# Patient Record
Sex: MALE | Race: BLACK OR AFRICAN AMERICAN | Employment: UNEMPLOYED | URBAN - METROPOLITAN AREA
[De-identification: names, ages, dates, MRNs, and addresses within clinical notes are randomized per-mention and may not be internally consistent; named-entity substitution may affect disease eponyms.]

---

## 2022-10-31 ENCOUNTER — APPOINTMENT (OUTPATIENT)
Dept: MRI IMAGING | Age: 67
DRG: 065 | End: 2022-10-31
Attending: INTERNAL MEDICINE
Payer: MEDICARE

## 2022-10-31 ENCOUNTER — HOSPITAL ENCOUNTER (INPATIENT)
Age: 67
LOS: 4 days | Discharge: HOME HEALTH | DRG: 065 | End: 2022-11-04
Attending: STUDENT IN AN ORGANIZED HEALTH CARE EDUCATION/TRAINING PROGRAM | Admitting: INTERNAL MEDICINE
Payer: MEDICARE

## 2022-10-31 ENCOUNTER — APPOINTMENT (OUTPATIENT)
Dept: GENERAL RADIOLOGY | Age: 67
DRG: 065 | End: 2022-10-31
Attending: EMERGENCY MEDICINE
Payer: MEDICARE

## 2022-10-31 ENCOUNTER — APPOINTMENT (OUTPATIENT)
Dept: CT IMAGING | Age: 67
DRG: 065 | End: 2022-10-31
Attending: EMERGENCY MEDICINE
Payer: MEDICARE

## 2022-10-31 DIAGNOSIS — I65.23 BILATERAL CAROTID ARTERY STENOSIS: ICD-10-CM

## 2022-10-31 DIAGNOSIS — I63.9 ACUTE CVA (CEREBROVASCULAR ACCIDENT) (HCC): ICD-10-CM

## 2022-10-31 DIAGNOSIS — R53.1 RIGHT SIDED WEAKNESS: Primary | ICD-10-CM

## 2022-10-31 LAB
ALBUMIN SERPL-MCNC: 3.9 G/DL (ref 3.5–5)
ALBUMIN/GLOB SERPL: 1.1 {RATIO} (ref 1.1–2.2)
ALP SERPL-CCNC: 76 U/L (ref 45–117)
ALT SERPL-CCNC: 33 U/L (ref 12–78)
ANION GAP SERPL CALC-SCNC: 3 MMOL/L (ref 5–15)
AST SERPL-CCNC: 22 U/L (ref 15–37)
BASOPHILS # BLD: 0 K/UL (ref 0–0.1)
BASOPHILS NFR BLD: 1 % (ref 0–1)
BILIRUB SERPL-MCNC: 0.6 MG/DL (ref 0.2–1)
BUN SERPL-MCNC: 8 MG/DL (ref 6–20)
BUN/CREAT SERPL: 8 (ref 12–20)
CALCIUM SERPL-MCNC: 9.2 MG/DL (ref 8.5–10.1)
CHLORIDE SERPL-SCNC: 108 MMOL/L (ref 97–108)
CO2 SERPL-SCNC: 29 MMOL/L (ref 21–32)
CREAT SERPL-MCNC: 1 MG/DL (ref 0.7–1.3)
DIFFERENTIAL METHOD BLD: NORMAL
EOSINOPHIL # BLD: 0.1 K/UL (ref 0–0.4)
EOSINOPHIL NFR BLD: 1 % (ref 0–7)
ERYTHROCYTE [DISTWIDTH] IN BLOOD BY AUTOMATED COUNT: 13.8 % (ref 11.5–14.5)
GLOBULIN SER CALC-MCNC: 3.5 G/DL (ref 2–4)
GLUCOSE SERPL-MCNC: 106 MG/DL (ref 65–100)
HCT VFR BLD AUTO: 49.2 % (ref 36.6–50.3)
HGB BLD-MCNC: 16.6 G/DL (ref 12.1–17)
IMM GRANULOCYTES # BLD AUTO: 0 K/UL (ref 0–0.04)
IMM GRANULOCYTES NFR BLD AUTO: 0 % (ref 0–0.5)
INR PPP: 1 (ref 0.9–1.1)
LYMPHOCYTES # BLD: 2.1 K/UL (ref 0.8–3.5)
LYMPHOCYTES NFR BLD: 41 % (ref 12–49)
MCH RBC QN AUTO: 32.9 PG (ref 26–34)
MCHC RBC AUTO-ENTMCNC: 33.7 G/DL (ref 30–36.5)
MCV RBC AUTO: 97.6 FL (ref 80–99)
MONOCYTES # BLD: 0.6 K/UL (ref 0–1)
MONOCYTES NFR BLD: 13 % (ref 5–13)
NEUTS SEG # BLD: 2.3 K/UL (ref 1.8–8)
NEUTS SEG NFR BLD: 44 % (ref 32–75)
NRBC # BLD: 0 K/UL (ref 0–0.01)
NRBC BLD-RTO: 0 PER 100 WBC
PLATELET # BLD AUTO: 201 K/UL (ref 150–400)
PMV BLD AUTO: 10.8 FL (ref 8.9–12.9)
POTASSIUM SERPL-SCNC: 4.2 MMOL/L (ref 3.5–5.1)
PROT SERPL-MCNC: 7.4 G/DL (ref 6.4–8.2)
PROTHROMBIN TIME: 10.3 SEC (ref 9–11.1)
RBC # BLD AUTO: 5.04 M/UL (ref 4.1–5.7)
SODIUM SERPL-SCNC: 140 MMOL/L (ref 136–145)
TROPONIN-HIGH SENSITIVITY: 15 NG/L (ref 0–76)
WBC # BLD AUTO: 5.1 K/UL (ref 4.1–11.1)

## 2022-10-31 PROCEDURE — 36415 COLL VENOUS BLD VENIPUNCTURE: CPT

## 2022-10-31 PROCEDURE — 70551 MRI BRAIN STEM W/O DYE: CPT

## 2022-10-31 PROCEDURE — 85610 PROTHROMBIN TIME: CPT

## 2022-10-31 PROCEDURE — 94760 N-INVAS EAR/PLS OXIMETRY 1: CPT

## 2022-10-31 PROCEDURE — 80053 COMPREHEN METABOLIC PANEL: CPT

## 2022-10-31 PROCEDURE — 74011250637 HC RX REV CODE- 250/637: Performed by: INTERNAL MEDICINE

## 2022-10-31 PROCEDURE — 71045 X-RAY EXAM CHEST 1 VIEW: CPT

## 2022-10-31 PROCEDURE — 70450 CT HEAD/BRAIN W/O DYE: CPT

## 2022-10-31 PROCEDURE — 74011250636 HC RX REV CODE- 250/636: Performed by: INTERNAL MEDICINE

## 2022-10-31 PROCEDURE — 65270000046 HC RM TELEMETRY

## 2022-10-31 PROCEDURE — 93005 ELECTROCARDIOGRAM TRACING: CPT

## 2022-10-31 PROCEDURE — 85025 COMPLETE CBC W/AUTO DIFF WBC: CPT

## 2022-10-31 PROCEDURE — 99285 EMERGENCY DEPT VISIT HI MDM: CPT

## 2022-10-31 PROCEDURE — 84484 ASSAY OF TROPONIN QUANT: CPT

## 2022-10-31 RX ORDER — AMLODIPINE BESYLATE 5 MG/1
5 TABLET ORAL DAILY
Status: DISCONTINUED | OUTPATIENT
Start: 2022-10-31 | End: 2022-11-01

## 2022-10-31 RX ORDER — LABETALOL HYDROCHLORIDE 5 MG/ML
5 INJECTION, SOLUTION INTRAVENOUS
Status: DISCONTINUED | OUTPATIENT
Start: 2022-10-31 | End: 2022-11-04 | Stop reason: HOSPADM

## 2022-10-31 RX ORDER — ENOXAPARIN SODIUM 100 MG/ML
40 INJECTION SUBCUTANEOUS EVERY 24 HOURS
Status: DISCONTINUED | OUTPATIENT
Start: 2022-10-31 | End: 2022-11-04 | Stop reason: HOSPADM

## 2022-10-31 RX ORDER — GUAIFENESIN 100 MG/5ML
81 LIQUID (ML) ORAL DAILY
Status: DISCONTINUED | OUTPATIENT
Start: 2022-11-01 | End: 2022-11-04 | Stop reason: HOSPADM

## 2022-10-31 RX ORDER — ACETAMINOPHEN 325 MG/1
650 TABLET ORAL
Status: DISCONTINUED | OUTPATIENT
Start: 2022-10-31 | End: 2022-11-04 | Stop reason: HOSPADM

## 2022-10-31 RX ADMIN — ENOXAPARIN SODIUM 40 MG: 100 INJECTION SUBCUTANEOUS at 15:54

## 2022-10-31 RX ADMIN — AMLODIPINE BESYLATE 5 MG: 5 TABLET ORAL at 15:54

## 2022-10-31 NOTE — PROGRESS NOTES
End of Shift Note    Bedside shift change report given to Alena Bond, RN (oncoming nurse) by Diego Lobato RN (offgoing nurse). Report included the following information     Shift worked:  7a-7p   Shift summary and any significant changes:     New admit, MRI completed, CVA education and risk factors discussed        Concerns for physician to address:     Zone phone for oncoming shift:        Patient Information  Margarita Fields  74 y.o.  10/31/2022  1:58 PM by Jalyn Henry MD. Margarita Fields was admitted from     Problem List  Patient Active Problem List    Diagnosis Date Noted    Acute CVA (cerebrovascular accident) ([facility] [address] 10/31/2022     No past medical history on file. Core Measures:  CVA:  Y  CHF: N  PNA: N    Activity:  Activity Level: Up with Assistance  Number times ambulated in hallways past shift:   Number of times OOB to chair past shift:     Cardiac:   Cardiac Monitoring:            Access:   Current line(s):    Central Line? Placement date  Reason Medically Necessary   PICC LINE? Placement date Reason Medically Necessary     Genitourinary:   Urinary status:   Urinary Catheter? Placement Date  Reason Medically Necessary     Respiratory:   O2 Device: None (Room air)  Chronic home O2 use?:   Incentive spirometer at bedside:        GI:     Current diet:  ADULT DIET Regular  DIET ONE TIME MESSAGE  Passing flatus: Tolerating current diet:        Pain Management:   Patient states pain is manageable on current regimen:     Skin:  Ger Score: 21  Interventions:     Patient Safety:  Fall Score:  Total Score: 2  Interventions:      @Rollbelt  @dexterity to release roll belt  Yes/No ( must document dexterity  here by stating Yes or No here, otherwise this is a restraint and must follow restraint documentation policy.)    DVT prophylaxis:  DVT prophylaxis Med-   DVT prophylaxis SCD or VIJAYA-      Wounds: (If Applicable)  Wounds-   Location     Active Consults:  IP CONSULT TO HOSPITALIST  IP CONSULT TO NEUROLOGY    Length of Stay:  Expected LOS: - - -  Actual LOS: 0  Discharge Plan:

## 2022-10-31 NOTE — PROGRESS NOTES
MRI Pending:    Need completed online [employer] MRI screening form. Sign and fax to [phone]. Call [phone] once faxed.

## 2022-10-31 NOTE — ED PROVIDER NOTES
EMERGENCY DEPARTMENT HISTORY AND PHYSICAL EXAM      Date: 10/31/2022  Patient Name: Lona Salcido    History of Presenting Illness     Chief Complaint   Patient presents with    Difficulty Walking     Started on Saturday; patient states he woke up and was unable to walk; ambulatory in triage with a waddle; he states \" I was dunking basketballs on Friday. Reports right sided weakness mainly with standing. HPI: Lona Salcido, 74 y.o. male presents to the ED with cc of right-sided weakness. He notices when he woke up in the morning Saturday. Last known well would have been Friday around 11:30 PM before he went to bed. He feels like his entire right arm and right leg are weak, and also a bit numb. It makes it hard to walk and hard to  things. He denies any difficulty speaking or swallowing, no vision changes, no headaches. He otherwise feels well, no fevers. She denies any history of prior strokes. There are no other complaints, changes, or physical findings at this time. PCP: None    Medications: Does not take any current outpatient medications      Past History     Past Medical History:  No known past medical history, however he does not have a primary care doctor or get regular medical care    Past Surgical History:  Denies prior surgeries    Family History:  Hypertension, diabetes    Social History:  Reports history of tobacco and alcohol, denies illicit drug use    Allergies:  No Known Allergies      Review of Systems   no fever  No ear pain  No eye pain  no shortness of breath  no chest pain  no abdominal pain  no dysuria  no leg pain  No rash  No lymphadenopathy  No weight loss    Physical Exam   Physical Exam  Constitutional:       General: He is not in acute distress. Appearance: He is not toxic-appearing. HENT:      Head: Normocephalic and atraumatic. Eyes:      Extraocular Movements: Extraocular movements intact.    Cardiovascular:      Rate and Rhythm: Normal rate and regular rhythm. Pulmonary:      Effort: Pulmonary effort is normal.      Breath sounds: Normal breath sounds. Abdominal:      Palpations: Abdomen is soft. Tenderness: There is no abdominal tenderness. Musculoskeletal:         General: No deformity. Cervical back: Neck supple. Skin:     General: Skin is warm and dry. Neurological:      Mental Status: He is alert and oriented to person, place, and time. Cranial Nerves: No cranial nerve deficit. Comments: Normal straight arm and straight leg raise bilaterally, however diminished strength against resistance with right shoulder flexion as compared to 5 out of 5 strength with the left shoulder flexion. Diminished strength against resistance of the right hip flexion as compared to 5 out of 5 strength with left hip flexion. Diminished strength with right ankle flexion and extension, 5 out of 5 strength with plantar ankle flexion extension. Sensation to light touch intact diffusely symmetrically over upper and lower extremities bilaterally, speech is clear and coherent. Normal finger-to-nose test bilaterally. Negative extinction in upper and lower extremities bilaterally, visual fields intact bilaterally. Psychiatric:         Mood and Affect: Mood normal.       Diagnostic Study Results     Labs -     Recent Results (from the past 24 hour(s))   CBC WITH AUTOMATED DIFF    Collection Time: 10/31/22 12:35 PM   Result Value Ref Range    WBC 5.1 4.1 - 11.1 K/uL    RBC 5.04 4.10 - 5.70 M/uL    HGB 16.6 12.1 - 17.0 g/dL    HCT 49.2 36.6 - 50.3 %    MCV 97.6 80.0 - 99.0 FL    MCH 32.9 26.0 - 34.0 PG    MCHC 33.7 30.0 - 36.5 g/dL    RDW 13.8 11.5 - 14.5 %    PLATELET 728 006 - 354 K/uL    MPV 10.8 8.9 - 12.9 FL    NRBC 0.0 0  WBC    ABSOLUTE NRBC 0.00 0.00 - 0.01 K/uL    NEUTROPHILS 44 32 - 75 %    LYMPHOCYTES 41 12 - 49 %    MONOCYTES 13 5 - 13 %    EOSINOPHILS 1 0 - 7 %    BASOPHILS 1 0 - 1 %    IMMATURE GRANULOCYTES 0 0.0 - 0.5 %    ABS. NEUTROPHILS 2.3 1.8 - 8.0 K/UL    ABS. LYMPHOCYTES 2.1 0.8 - 3.5 K/UL    ABS. MONOCYTES 0.6 0.0 - 1.0 K/UL    ABS. EOSINOPHILS 0.1 0.0 - 0.4 K/UL    ABS. BASOPHILS 0.0 0.0 - 0.1 K/UL    ABS. IMM. GRANS. 0.0 0.00 - 0.04 K/UL    DF AUTOMATED     METABOLIC PANEL, COMPREHENSIVE    Collection Time: 10/31/22 12:35 PM   Result Value Ref Range    Sodium 140 136 - 145 mmol/L    Potassium 4.2 3.5 - 5.1 mmol/L    Chloride 108 97 - 108 mmol/L    CO2 29 21 - 32 mmol/L    Anion gap 3 (L) 5 - 15 mmol/L    Glucose 106 (H) 65 - 100 mg/dL    BUN 8 6 - 20 MG/DL    Creatinine 1.00 0.70 - 1.30 MG/DL    BUN/Creatinine ratio 8 (L) 12 - 20      eGFR >60 >60 ml/min/1.73m2    Calcium 9.2 8.5 - 10.1 MG/DL    Bilirubin, total 0.6 0.2 - 1.0 MG/DL    ALT (SGPT) 33 12 - 78 U/L    AST (SGOT) 22 15 - 37 U/L    Alk. phosphatase 76 45 - 117 U/L    Protein, total 7.4 6.4 - 8.2 g/dL    Albumin 3.9 3.5 - 5.0 g/dL    Globulin 3.5 2.0 - 4.0 g/dL    A-G Ratio 1.1 1.1 - 2.2     PROTHROMBIN TIME + INR    Collection Time: 10/31/22 12:35 PM   Result Value Ref Range    INR 1.0 0.9 - 1.1      Prothrombin time 10.3 9.0 - 11.1 sec   TROPONIN-HIGH SENSITIVITY    Collection Time: 10/31/22 12:35 PM   Result Value Ref Range    Troponin-High Sensitivity 15 0 - 76 ng/L       Radiologic Studies -   CT HEAD WO CONT   Final Result   No acute intracranial abnormality            XR CHEST PORT   Final Result      No acute process on portable chest.         MRI BRAIN WO CONT    (Results Pending)     CT Results  (Last 48 hours)                 10/31/22 1227  CT HEAD WO CONT Final result    Impression:  No acute intracranial abnormality               Narrative:  EXAM: CT HEAD WO CONT       INDICATION: R weakness       COMPARISON: None. CONTRAST: None. TECHNIQUE: Unenhanced CT of the head was performed using 5 mm images. Brain and   bone windows were generated. Coronal and sagittal reformats.  CT dose reduction   was achieved through use of a standardized protocol tailored for this   examination and automatic exposure control for dose modulation. FINDINGS:   The ventricles and sulci are normal in size, shape and configuration. . There is   no significant white matter disease. There is no intracranial hemorrhage,   extra-axial collection, or mass effect. The basilar cisterns are open. No CT   evidence of acute infarct. The bone windows demonstrate no abnormalities. Mucous retention cyst is noted in   the left maxillary sinus. .                 CXR Results  (Last 48 hours)                 10/31/22 1222  XR CHEST PORT Final result    Impression:      No acute process on portable chest.           Narrative:  EXAM:  XR CHEST PORT       INDICATION: Stroke       COMPARISON: none       TECHNIQUE: portable chest AP view       FINDINGS: The cardiac silhouette is within normal limits. The pulmonary   vasculature is within normal limits. The lungs and pleural spaces are clear. The visualized bones and upper abdomen   are age-appropriate. Medical Decision Making   I am the first provider for this patient. I reviewed the vital signs, available nursing notes, past medical history, past surgical history, family history and social history. Vital Signs-Reviewed the patient's vital signs. Patient Vitals for the past 24 hrs:   Temp Pulse Resp BP SpO2   10/31/22 1156 98.4 °F (36.9 °C) 68 16 (!) 173/93 97 %         Provider Notes (Medical Decision Making):   82-year-old male presenting with right-sided weakness and numbness. Differential includes CVA, TIA, intracranial mass. ED Course:     Initial assessment performed. The patients presenting problems have been discussed, and they are in agreement with the care plan formulated and outlined with them. I have encouraged them to ask questions as they arise throughout their visit.         CBC negative for leukocytosis or anemia, basic metabolic panel with normal renal function, no worrisome electrolyte abnormalities, troponin is not significantly elevated at 15, CT head shows no acute intracranial abnormality, chest x-ray shows no acute process. EKG is performed at 13: 02, shows sinus bradycardia rate of 55, , QRS 92, QTc 397, axis upright, no ST segment elevation or depression concerning for ACS, this is interpreted as normal sinus bradycardia. Chart is reviewed, patient has not no prior medical encounters in our system, he has blood pressure is elevated here, I am concerned for potential undiagnosed hypertension versus elevated blood pressure in setting of CVA. Otherwise his vitals are unremarkable. Reevaluation, he is resting comfortably. Agrees to admission. Critical Care Time:         Disposition:  Admit    PLAN:  1. There are no discharge medications for this patient.     2.   Follow-up Information    None       Return to ED if worse     Diagnosis     Clinical Impression: Acute right-sided weakness and numbness

## 2022-10-31 NOTE — ED NOTES
Report was give to robin rn questions asked and answered.  Room was still not cleaned so rn will call back when cleaned for transport

## 2022-10-31 NOTE — PROGRESS NOTES
Speech Pathology Note    SLP orders received, however note H&P still pending. SLP will follow for formal evaluation when additional information available in chart. Thank you.     Junior Chris M.S., CCC-SLP

## 2022-11-01 ENCOUNTER — APPOINTMENT (OUTPATIENT)
Dept: VASCULAR SURGERY | Age: 67
DRG: 065 | End: 2022-11-01
Attending: INTERNAL MEDICINE
Payer: MEDICARE

## 2022-11-01 LAB
ANION GAP SERPL CALC-SCNC: 5 MMOL/L (ref 5–15)
BUN SERPL-MCNC: 8 MG/DL (ref 6–20)
BUN/CREAT SERPL: 9 (ref 12–20)
CALCIUM SERPL-MCNC: 8.9 MG/DL (ref 8.5–10.1)
CHLORIDE SERPL-SCNC: 107 MMOL/L (ref 97–108)
CHOLEST SERPL-MCNC: 159 MG/DL
CO2 SERPL-SCNC: 27 MMOL/L (ref 21–32)
CREAT SERPL-MCNC: 0.91 MG/DL (ref 0.7–1.3)
ERYTHROCYTE [DISTWIDTH] IN BLOOD BY AUTOMATED COUNT: 13.7 % (ref 11.5–14.5)
EST. AVERAGE GLUCOSE BLD GHB EST-MCNC: 103 MG/DL
GLUCOSE SERPL-MCNC: 112 MG/DL (ref 65–100)
HBA1C MFR BLD: 5.2 % (ref 4–5.6)
HCT VFR BLD AUTO: 47.2 % (ref 36.6–50.3)
HDLC SERPL-MCNC: 44 MG/DL
HDLC SERPL: 3.6 {RATIO} (ref 0–5)
HGB BLD-MCNC: 15.8 G/DL (ref 12.1–17)
LDLC SERPL CALC-MCNC: 87.8 MG/DL (ref 0–100)
LEFT CCA DIST DIAS: 8.9 CM/S
LEFT CCA DIST SYS: 42.1 CM/S
LEFT CCA PROX DIAS: 19.2 CM/S
LEFT CCA PROX SYS: 90.3 CM/S
LEFT ECA DIAS: 7.91 CM/S
LEFT ECA SYS: 53 CM/S
LEFT ICA DIST DIAS: 30.3 CM/S
LEFT ICA DIST SYS: 84.2 CM/S
LEFT ICA MID DIAS: 20 CM/S
LEFT ICA MID SYS: 58.5 CM/S
LEFT ICA PROX DIAS: 6.1 CM/S
LEFT ICA PROX SYS: 22.6 CM/S
LEFT ICA/CCA SYS: 2 NO UNITS
LEFT VERTEBRAL DIAS: 10.64 CM/S
LEFT VERTEBRAL SYS: 39.4 CM/S
MCH RBC QN AUTO: 32.6 PG (ref 26–34)
MCHC RBC AUTO-ENTMCNC: 33.5 G/DL (ref 30–36.5)
MCV RBC AUTO: 97.3 FL (ref 80–99)
NRBC # BLD: 0 K/UL (ref 0–0.01)
NRBC BLD-RTO: 0 PER 100 WBC
PLATELET # BLD AUTO: 184 K/UL (ref 150–400)
PMV BLD AUTO: 10.3 FL (ref 8.9–12.9)
POTASSIUM SERPL-SCNC: 3.9 MMOL/L (ref 3.5–5.1)
RBC # BLD AUTO: 4.85 M/UL (ref 4.1–5.7)
RIGHT CCA DIST DIAS: 13.4 CM/S
RIGHT CCA DIST SYS: 42 CM/S
RIGHT CCA PROX DIAS: 13.6 CM/S
RIGHT CCA PROX SYS: 102.3 CM/S
RIGHT ECA DIAS: 17.47 CM/S
RIGHT ECA SYS: 72.4 CM/S
RIGHT ICA DIST DIAS: 30.7 CM/S
RIGHT ICA DIST SYS: 75.7 CM/S
RIGHT ICA MID DIAS: 18.5 CM/S
RIGHT ICA MID SYS: 40.3 CM/S
RIGHT ICA PROX DIAS: 8.9 CM/S
RIGHT ICA PROX SYS: 25.5 CM/S
RIGHT ICA/CCA SYS: 1.8 NO UNITS
RIGHT VERTEBRAL DIAS: 10.61 CM/S
RIGHT VERTEBRAL SYS: 37.7 CM/S
SODIUM SERPL-SCNC: 139 MMOL/L (ref 136–145)
TRIGL SERPL-MCNC: 136 MG/DL (ref ?–150)
TSH SERPL DL<=0.05 MIU/L-ACNC: 1.71 UIU/ML (ref 0.36–3.74)
VAS LEFT SUBCLAVIAN PROX EDV: 0 CM/S
VAS LEFT SUBCLAVIAN PROX PSV: 114.5 CM/S
VAS RIGHT SUBCLAVIAN PROX EDV: 0 CM/S
VAS RIGHT SUBCLAVIAN PROX PSV: 101.6 CM/S
VLDLC SERPL CALC-MCNC: 27.2 MG/DL
WBC # BLD AUTO: 4.9 K/UL (ref 4.1–11.1)

## 2022-11-01 PROCEDURE — 65270000046 HC RM TELEMETRY

## 2022-11-01 PROCEDURE — 99223 1ST HOSP IP/OBS HIGH 75: CPT | Performed by: PSYCHIATRY & NEUROLOGY

## 2022-11-01 PROCEDURE — 80048 BASIC METABOLIC PNL TOTAL CA: CPT

## 2022-11-01 PROCEDURE — 97530 THERAPEUTIC ACTIVITIES: CPT

## 2022-11-01 PROCEDURE — 93880 EXTRACRANIAL BILAT STUDY: CPT | Performed by: PSYCHIATRY & NEUROLOGY

## 2022-11-01 PROCEDURE — 97535 SELF CARE MNGMENT TRAINING: CPT

## 2022-11-01 PROCEDURE — 80061 LIPID PANEL: CPT

## 2022-11-01 PROCEDURE — 97116 GAIT TRAINING THERAPY: CPT

## 2022-11-01 PROCEDURE — 93880 EXTRACRANIAL BILAT STUDY: CPT

## 2022-11-01 PROCEDURE — 74011250637 HC RX REV CODE- 250/637: Performed by: INTERNAL MEDICINE

## 2022-11-01 PROCEDURE — 97161 PT EVAL LOW COMPLEX 20 MIN: CPT

## 2022-11-01 PROCEDURE — 84443 ASSAY THYROID STIM HORMONE: CPT

## 2022-11-01 PROCEDURE — 36415 COLL VENOUS BLD VENIPUNCTURE: CPT

## 2022-11-01 PROCEDURE — 92523 SPEECH SOUND LANG COMPREHEN: CPT

## 2022-11-01 PROCEDURE — 74011250636 HC RX REV CODE- 250/636: Performed by: INTERNAL MEDICINE

## 2022-11-01 PROCEDURE — 83036 HEMOGLOBIN GLYCOSYLATED A1C: CPT

## 2022-11-01 PROCEDURE — 97112 NEUROMUSCULAR REEDUCATION: CPT

## 2022-11-01 PROCEDURE — 97165 OT EVAL LOW COMPLEX 30 MIN: CPT

## 2022-11-01 PROCEDURE — 85027 COMPLETE CBC AUTOMATED: CPT

## 2022-11-01 RX ORDER — ATORVASTATIN CALCIUM 40 MG/1
40 TABLET, FILM COATED ORAL
Status: DISCONTINUED | OUTPATIENT
Start: 2022-11-01 | End: 2022-11-04 | Stop reason: HOSPADM

## 2022-11-01 RX ORDER — AMLODIPINE BESYLATE 5 MG/1
10 TABLET ORAL DAILY
Status: DISCONTINUED | OUTPATIENT
Start: 2022-11-02 | End: 2022-11-04 | Stop reason: HOSPADM

## 2022-11-01 RX ORDER — ONDANSETRON 2 MG/ML
4 INJECTION INTRAMUSCULAR; INTRAVENOUS
Status: DISCONTINUED | OUTPATIENT
Start: 2022-11-01 | End: 2022-11-04 | Stop reason: HOSPADM

## 2022-11-01 RX ADMIN — ASPIRIN 81 MG: 81 TABLET, CHEWABLE ORAL at 10:44

## 2022-11-01 RX ADMIN — AMLODIPINE BESYLATE 5 MG: 5 TABLET ORAL at 10:44

## 2022-11-01 RX ADMIN — ENOXAPARIN SODIUM 40 MG: 100 INJECTION SUBCUTANEOUS at 15:08

## 2022-11-01 RX ADMIN — ATORVASTATIN CALCIUM 40 MG: 40 TABLET, FILM COATED ORAL at 22:41

## 2022-11-01 NOTE — PROGRESS NOTES
SPEECH LANGUAGE PATHOLOGY EVALUATION/DISCHARGE  Patient: Kaylynn Faria [de-identified]74 y.o. male)  Date: 11/1/2022  Primary Diagnosis: Acute CVA (cerebrovascular accident) [facility]) [I63.9]       Precautions:        ASSESSMENT :  Based on the objective data described below, the patient presents with [facility] motor speech and language. MRI revealed \"Small acute infarct left basal ganglia region. \" Note patient passed the [facility] screen and a Regular/Thin Liquid diet was ordered. Patient informally observed drinking thin liquids without difficulty. Patient denied any change in motor speech and patient observed to be intelligible at the conversation level. Patient reported mild word finding difficulty at baseline and observed with one instance of word finding difficulty during conversation on this date, in which increased time benefited patient. Patient completed delayed recall, divergent, responsive, and automatic language tasks without difficulty. At this juncture, skilled acute therapy provided by a speech-language pathologist is not indicated at this time. Will sign off. PLAN :  Recommendations:  -- Regular/Thin Liquids  -- Medication as Tolerated  -- No further acute SLP needs    Discharge Recommendations: Rehab     SUBJECTIVE:   Patient stated today's my son's birthday. OBJECTIVE:   No past medical history on file. No past surgical history on file.     Prior Level of Function/Home Situation:   Home Situation  Patient Expects to be Discharged to[de-identified] Home    Mental Status:  Neurologic State: Alert  Orientation Level: Oriented X4  Cognition: Appropriate for age attention/concentration, Follows commands  Perception: Appears intact  Perseveration: No perseveration noted  Safety/Judgement: Awareness of environment, Insight into deficits    Language Comprehension and Expression:  Auditory Comprehension  Auditory Impairment: No   Response to Basic Yes/No Questions (%): 100 %  One-Step Basic Commands (%): 100 %  Verbal Expression  Primary Mode of Expression: Verbal  Initiation: No impairment  Automatic Speech Task: No impairment  Naming: No impairment  Divergent (%): 100 %  Responsive (%): 100 %          Neuro-Linguistics:                    Memory: No Impairment            Memory Exercises  Recall Message Parts: 3  Recall Message Time Delay: 10 minutes  Recall Message Accuracy (%): 100 %                   Voice:   WFL                                              NOMS: The NOMS functional outcome measure was used to quantify this patient's level of expressive language impairment. Based on the NOMS, the patient was determined to be at level 7 for spoken language expression. NOMS Spoken Language Expression:  Level 1 (CN): Verbalizations not meaningful to anyone. Level 2 (CM): Few attempts accurate/appropriate. Max cues to elicit automatic/imitative words/phrases. Level 3 (CL): Communication partner responsible for communication; Mod cues for words/phrases meaningful in context  Level 4 (CK): Initiate during simple, routine activities w/familiar partner. Mod cues to produce simple sentences  Level 5 (Padmini Dress): Initiates communication with familiar and unfamiliar partners. Min cues for complex sentences. Level 6 (CI): Communicates for most activities. Some limitations still present for vocational/social activities. Rarely cued for complex info  Level 7 [facility]): Independent communication. WILEY. (2003). National Outcomes Measurement System (NOMS): Adult Speech-Language Pathology User's Guide. Pain:  Pain Scale 1: Numeric (0 - 10)  Pain Intensity 1: 0       After treatment:   Patient left in no apparent distress in bed, Call bell within reach, and Nursing notified    COMMUNICATION/EDUCATION:   Patient was educated regarding his [facility] motor speech and language. He demonstrated good understanding as evidenced by verbalizing understanding.     The patient's plan of care including recommendations, planned interventions, and recommended diet changes were discussed with: Registered nurse.        Thank you for this referral.  RACHANA Way  Time Calculation: 15 mins

## 2022-11-01 NOTE — PROGRESS NOTES
Reason for Admission:  Acute CVA                     RUR Score:  6%                   Plan for utilizing home health:  Declines        PCP: First and Last name:  None     Name of Practice:    Are you a current patient: Yes/No:    Approximate date of last visit:    Can you participate in a virtual visit with your PCP:                     Current Advanced Directive/Advance Care Plan: Full Code  JAK confirmed that patient is a Full Code    Healthcare Decision Maker:   Click here to complete [id number] including selection of the Healthcare Decision Maker Relationship (ie \"Primary\")           Marianela Grace Tovey, [phone]                  Transition of Care Plan:                    CM spoke with patient over the phone. Patient lives at home with his girlfriend and their children. Patient uses no DME and is independent at baseline. Patient's girlfriend will be his transport. Current Dispo: Home/self.

## 2022-11-01 NOTE — H&P
Hospitalist Admission Note    NAME: Siria Pretty   :  1955   MRN:  [id number]     Date/Time:  10/31/2022 9:24 PM    Patient PCP: None  ______________________________________________________________________  Given the patient's current clinical presentation, I have a high level of concern for decompensation if discharged from the emergency department. Complex decision making was performed, which includes reviewing the patient's available past medical records, laboratory results, and x-ray films. My assessment of this patient's clinical condition and my plan of care is as follows. Assessment / Plan:  Acute onset of right-sided weakness due to suspected cerebrovascular accident  -CT head is within normal limits  -Initiate CVA protocol order set. Check MRI of the brain and also 2D echocardiogram.  Check hemoglobin A1c and lipid panel.  -Start aspirin 81 mg daily  -Consult PT OT. Consult neurology. Elevated blood pressure, concerning for new onset hypertension  Hypertensive urgency  -Patient reports that he has no previous history of hypertension but has not seen a physician in many years  -He probably has undiagnosed hypertension  -His blood pressure got elevated 187/100 while in the ED consistent with hypertensive urgency  -Seen symptoms started more than 3 days ago we will start him on antihypertensives. Start Norvasc 5 mg daily and to go up on the dose as tolerated  -I notified him that he will need to find a primary care physician and have routine follow-up from here on to prevent endorgan damage.             Code Status: Full code  Surrogate Decision Maker:    DVT Prophylaxis: Lovenox  GI Prophylaxis: not indicated    Baseline: From home, independent of ADLs      Subjective:   CHIEF COMPLAINT: Right-sided weakness    HISTORY OF PRESENT ILLNESS:     Siria Pretty is a 74 y.o.   male who presents with no significant past Friday when he started having some right arm and leg weakness for. The ED walking secondary to right  Vision and slurred speech or facial deviation. Did not experience any loss of consciousness. Does not report any weakness or tingling on the left side. Does not report any chest pain or shortness of breath. Denies any abdominal pain, nausea or vomiting. CT head is normal.  Chest x-ray is normal.    On arrival to emergency department, patient was noted to have elevated blood pressure of 173/93. On labs CBC is normal.  CMP is within normal limits. Troponin is 15. We were asked to admit for work up and evaluation of the above problems. Past medical history  None    Past surgical history  None    Social History     Tobacco Use    Smoking status: Not on file    Smokeless tobacco: Not on file   Substance Use Topics    Alcohol use: Not on file      Does not report any smoking or alcohol currently      Family history  No CAD in the family      No Known Allergies     Prior to Admission medications    Not on File       REVIEW OF SYSTEMS:     I am not able to complete the review of systems because:    The patient is intubated and sedated    The patient has altered mental status due to his acute medical problems    The patient has baseline aphasia from prior stroke(s)    The patient has baseline dementia and is not reliable historian    The patient is in acute medical distress and unable to provide information           Total of 12 systems reviewed as follows:       POSITIVE= underlined text  Negative = text not underlined  General:  fever, chills, sweats, generalized weakness, weight loss/gain,      loss of appetite   Eyes:    blurred vision, eye pain, loss of vision, double vision  ENT:    rhinorrhea, pharyngitis   Respiratory:   cough, sputum production, SOB, MARTINEZ, wheezing, pleuritic pain   Cardiology:   chest pain, palpitations, orthopnea, PND, edema, syncope   Gastrointestinal:  abdominal pain , N/V, diarrhea, dysphagia, constipation, bleeding   Genitourinary: frequency, urgency, dysuria, hematuria, incontinence   Muskuloskeletal :  arthralgia, myalgia, back pain  Hematology:  easy bruising, nose or gum bleeding, lymphadenopathy   Dermatological: rash, ulceration, pruritis, color change / jaundice  Endocrine:   hot flashes or polydipsia   Neurological:  headache, dizziness, confusion, focal weakness, paresthesia,     Speech difficulties, memory loss, gait difficulty  Psychological: Feelings of anxiety, depression, agitation    Objective:   VITALS:    Visit Vitals  BP (!) 161/92   Pulse 78   Temp 97.9 °F (36.6 °C)   Resp 18   Ht 6' 2\" (1.88 m)   Wt 93.2 kg (205 lb 7.5 oz)   SpO2 98%   BMI 26.38 kg/m²       PHYSICAL EXAM:    General:    Alert, cooperative, no distress, appears stated age. HEENT: Atraumatic, anicteric sclerae, pink conjunctivae     No oral ulcers, mucosa moist, throat clear, dentition fair  Neck:  Supple, symmetrical,  thyroid: non tender  Lungs:   Clear to auscultation bilaterally. No Wheezing or Rhonchi. No rales. Chest wall:  No tenderness  No Accessory muscle use. Heart:   Regular  rhythm,  No  murmur   No edema  Abdomen:   Soft, non-tender. Not distended. Bowel sounds normal  Extremities: No cyanosis. No clubbing,      Skin turgor normal, Capillary refill normal, Radial dial pulse 2+  Skin:     Not pale. Not Jaundiced  No rashes   Psych:  Good insight. Not depressed. Not anxious or agitated.   Neurologic: Alert, awake, oriented x3, mild weakness of right finger , able to lift all 4 extremities against gravity, sensation to light touch intact, reflexes are 2+, cranial nerves II through XII intact    _______________________________________________________________________  Care Plan discussed with:    Comments   Patient y    Family      RN y    Care Manager                    Consultant:      _______________________________________________________________________  Expected  Disposition:   Home with Family y   HH/PT/OT/RN    SNF/LTC    KINJAL ________________________________________________________________________  TOTAL TIME:  60  Minutes    Critical Care Provided     Minutes non procedure based      Comments    y Reviewed previous records   >50% of visit spent in counseling and coordination of care y Discussion with patient and/or family and questions answered       ________________________________________________________________________  Signed: Ermelinda Nation MD    Procedures: see electronic medical records for all procedures/Xrays and details which were not copied into this note but were reviewed prior to creation of Plan. LAB DATA REVIEWED:    Recent Results (from the past 24 hour(s))   CBC WITH AUTOMATED DIFF    Collection Time: 10/31/22 12:35 PM   Result Value Ref Range    WBC 5.1 4.1 - 11.1 K/uL    RBC 5.04 4.10 - 5.70 M/uL    HGB 16.6 12.1 - 17.0 g/dL    HCT 49.2 36.6 - 50.3 %    MCV 97.6 80.0 - 99.0 FL    MCH 32.9 26.0 - 34.0 PG    MCHC 33.7 30.0 - 36.5 g/dL    RDW 13.8 11.5 - 14.5 %    PLATELET 159 707 - 810 K/uL    MPV 10.8 8.9 - 12.9 FL    NRBC 0.0 0  WBC    ABSOLUTE NRBC 0.00 0.00 - 0.01 K/uL    NEUTROPHILS 44 32 - 75 %    LYMPHOCYTES 41 12 - 49 %    MONOCYTES 13 5 - 13 %    EOSINOPHILS 1 0 - 7 %    BASOPHILS 1 0 - 1 %    IMMATURE GRANULOCYTES 0 0.0 - 0.5 %    ABS. NEUTROPHILS 2.3 1.8 - 8.0 K/UL    ABS. LYMPHOCYTES 2.1 0.8 - 3.5 K/UL    ABS. MONOCYTES 0.6 0.0 - 1.0 K/UL    ABS. EOSINOPHILS 0.1 0.0 - 0.4 K/UL    ABS. BASOPHILS 0.0 0.0 - 0.1 K/UL    ABS. IMM.  GRANS. 0.0 0.00 - 0.04 K/UL    DF AUTOMATED     METABOLIC PANEL, COMPREHENSIVE    Collection Time: 10/31/22 12:35 PM   Result Value Ref Range    Sodium 140 136 - 145 mmol/L    Potassium 4.2 3.5 - 5.1 mmol/L    Chloride 108 97 - 108 mmol/L    CO2 29 21 - 32 mmol/L    Anion gap 3 (L) 5 - 15 mmol/L    Glucose 106 (H) 65 - 100 mg/dL    BUN 8 6 - 20 MG/DL    Creatinine 1.00 0.70 - 1.30 MG/DL    BUN/Creatinine ratio 8 (L) 12 - 20      eGFR >60 >60 ml/min/1.73m2    Calcium 9.2 8.5 - 10.1 MG/DL    Bilirubin, total 0.6 0.2 - 1.0 MG/DL    ALT (SGPT) 33 12 - 78 U/L    AST (SGOT) 22 15 - 37 U/L    Alk.  phosphatase 76 45 - 117 U/L    Protein, total 7.4 6.4 - 8.2 g/dL    Albumin 3.9 3.5 - 5.0 g/dL    Globulin 3.5 2.0 - 4.0 g/dL    A-G Ratio 1.1 1.1 - 2.2     PROTHROMBIN TIME + INR    Collection Time: 10/31/22 12:35 PM   Result Value Ref Range    INR 1.0 0.9 - 1.1      Prothrombin time 10.3 9.0 - 11.1 sec   TROPONIN-HIGH SENSITIVITY    Collection Time: 10/31/22 12:35 PM   Result Value Ref Range    Troponin-High Sensitivity 15 0 - 76 ng/L

## 2022-11-01 NOTE — CONSULTS
Date of Consultation:  November 1, 2022    Referring Physician: Sejal Hebert MD     Reason for Consultation:  Right sided weakness     Chief Complaint   Patient presents with    Difficulty Walking     Started on Saturday; patient states he woke up and was unable to walk; ambulatory in triage with a waddle; he states \" I was dunking basketballs on Friday. Reports right sided weakness mainly with standing. History of Present Illness:   Phan Anaya is a 74 y.o. male with no prior medical history as he has not seen a physician in several years is currently admitted to the hospital after he developed right-sided weakness. Patient states that he woke up 3 days prior to admission and started having some difficulty with ambulation. He reports that his right leg was weak and needed to hold onto objects while walking. He thought that this was due to back pain however his symptoms did not improve. He also noticed that he was very uncoordinated with the right upper extremity. For this reason he decided to come to the emergency room as his symptoms did not improve. Here patient was noted to have significant the elevated blood pressure with a reading of 187/114. He did undergo work-up for possible stroke including a noncontrast head CT which showed no acute abnormality. He did have an MRI of the brain which showed a left basal ganglia stroke. He was admitted to the hospital for further evaluation.   He did not have a CTA head and neck as his creatinine is slightly elevated    No known past medical history or surgical history    Family history is significant for diabetes and hypertension    Social history: Heavy tobacco use, drinks approximately 3 cups of hard liquor daily no drug use    Social History     Tobacco Use    Smoking status: Not on file    Smokeless tobacco: Not on file   Substance Use Topics    Alcohol use: Not on file        No Known Allergies     Prior to Admission medications    Not on File Review of Systems:    General, constitutional: negative  Eyes, vision: negative  Ears, nose, throat: negative  Cardiovascular, heart: negative  Respiratory: negative  Gastrointestinal: negative  Genitourinary: negative  Musculoskeletal: negative  Skin and integumentary: negative  Psychiatric: negative  Endocrine: negative  Neurological: negative, except for HPI  Hematologic/lymphatic: negative  Allergy/immunology: negative    PHYSICAL EXAMINATION:   Visit Vitals  BP (!) 148/102 (BP 1 Location: Left upper arm, BP Patient Position: At rest)   Pulse (!) 58   Temp 97.8 °F (36.6 °C)   Resp 18   Ht 6' 2\" (1.88 m)   Wt 205 lb 7.5 oz (93.2 kg)   SpO2 96%   BMI 26.38 kg/m²       Physical Exam:  General:  no acute distress  Neck: no carotid bruits  Lungs: clear to auscultation  Heart:  no murmurs, regular rate and rhythm   Lower extremity: no edema    Neurological exam:  Mental Status: Awake, alert, oriented to person, place and time  Attention and Concentration: able to state the days of the week backwards   Speech and Language: No dysarthria. Able to name, repeat and follow commands   Fund of knowledge was preserved    Cranial nerves: II-XII  Pupils equal and reactive, visual fields intact by confrontation   Extraocular movements intact, no evidence of nystagmus or ptosis   Facial sensation intact   Facial movements symmetric   Hearing intact to soft rub bilaterally   Shoulder shrug symmetric and strong   Tongue protrusion full and midline without fasciculation or atrophy    Motor:   Normal tone and Bulk   Drift: No evidence of pronator drift     Strength testing:   deltoid triceps biceps Wrist ext. Wrist flex. intrinsics   Right 5 5 5 5 5 5   Left 5 5 5 5 5 5      Hip flex. Hip ext. Knee ext. Knee flex Dorsi flex Plantar flex   Right  4+ 5 5 5 5 5   Left  5 5 5 5 5 5       Sensory:  Sensation intact to light touch and pinprick. There is no extinction.     Reflexes:   Biceps Triceps  Brachiorad Patellar Achilles Plantar Hoffmans   Right  3 2 3 3 2 Down Neg   Left  2 2 2 2 2 Down Neg      Cerebellar testing: Finger-nose-finger was intact however he did have some difficulty with the right side. He also did have difficulty with heel-to-shin on the right side    Gait was deferred given his right-sided weakness    LAB DATA REVIEWED:    Lab Results   Component Value Date/Time    Hemoglobin A1c 5.2 11/01/2022 01:09 AM    Sodium 139 11/01/2022 01:09 AM    Potassium 3.9 11/01/2022 01:09 AM    Chloride 107 11/01/2022 01:09 AM    Glucose 112 (H) 11/01/2022 01:09 AM    BUN 8 11/01/2022 01:09 AM    Creatinine 0.91 11/01/2022 01:09 AM    Calcium 8.9 11/01/2022 01:09 AM    WBC 4.9 11/01/2022 01:09 AM    HCT 47.2 11/01/2022 01:09 AM    HGB 15.8 11/01/2022 01:09 AM    PLATELET 722 63/32/7290 01:09 AM       Stroke workup    MRI Brain  Independent review of the MRI of the brain does reveal evidence of an area of diffusion restriction in the left basal ganglia. T2 flair sequence was reviewed and showed moderate chronic microvascular changes    Carotid Dopplers are pending     TTE:   Pending    Stroke labs:    HgBA1c    Lab Results   Component Value Date/Time    Hemoglobin A1c 5.2 11/01/2022 01:09 AM       LDL   Lab Results   Component Value Date/Time    LDL, calculated 87.8 11/01/2022 01:09 AM       EKG: Pending      Assessment and Plan:   Don Fry is a 74 y.o. male with no prior medical history as he has not seen a physician in several years is currently admitted to the hospital after he developed right-sided weakness, found to have a left basal ganglia stroke. Etiology of which is likely due to small vessel ischemic disease    Left basal ganglia stroke: Etiology is likely due to small vessel ischemic disease and uncontrolled hypertension.  - Recommend maintaining BP goal is less than 140/90 (this is the long term goal)   - would recommend to continue 81mg daily for secondary stroke prevention.   As his symptoms started more than 24 hours ago, there is no need to add Plavix in this situation.  - Risk factor modification: LDL 87 and hemoglobin A1c is 5.2%   - if LDL > 70, would start atorvastatin 40mg daily   - please obtain TTE to rule out intracardiac thrombus   - would monitor on telemetry to rule out arrhythmias    - please obtain PT/OT and speech consultations     Uncontrolled hypertension  - Patient will need close titration of his medications for a blood pressure goal of 140/90.  -Patient will also need close follow-up with her primary care doctor for ongoing management of his blood pressure    Alcohol dependence: Patient does report a heavy alcohol intake prior to admission.  -Please monitor on CIWA  -Would start thiamine, folic acid and multivitamin. We discussed extensively the importance of lifestyle modification including smoking cessation, diet, and incorporating exercise into daily routine. Options for tobacco cessation were discussed with the patient/family members. Thank you for the consult, will sign off. Please call with any additional questions. Please have patient follow-up in neurology clinic in 1 to 2 months.       Sofia Grant MD

## 2022-11-01 NOTE — ROUTINE PROCESS
End of Shift Note    Bedside shift change report given to MAGDALENA Lazaro (oncoming nurse) by Shameka Laws RN (offgoing nurse). Report included the following information     Shift worked:  7p - 7a   Shift summary and any significant changes:     None       Concerns for physician to address: None   Zone phone for oncoming shift:   [phone]     Patient Information  Tam Javier  74 y.o.  10/31/2022  1:58 PM by Cheryl Pierre MD. Tam Javier was admitted from     Problem List  Patient Active Problem List    Diagnosis Date Noted    Acute CVA (cerebrovascular accident) ([facility] [address] 10/31/2022     No past medical history on file. Core Measures:  CVA:  Y  CHF: N  PNA: N    Activity:  Activity Level: Up with Assistance  Number times ambulated in hallways past shift:   Number of times OOB to chair past shift:     Cardiac:   Cardiac Monitoring:            Access:   Current line(s):    Central Line? Placement date  Reason Medically Necessary   PICC LINE? Placement date Reason Medically Necessary     Genitourinary:   Urinary status:   Urinary Catheter? Placement Date  Reason Medically Necessary     Respiratory:   O2 Device: None (Room air)  Chronic home O2 use?:   Incentive spirometer at bedside:        GI:     Current diet:  ADULT DIET Regular  DIET ONE TIME MESSAGE  Passing flatus: Tolerating current diet:        Pain Management:   Patient states pain is manageable on current regimen:     Skin:  Ger Score: 21  Interventions:     Patient Safety:  Fall Score:  Total Score: 2  Interventions:      @Rollbelt  @dexterity to release roll belt  Yes/No ( must document dexterity  here by stating Yes or No here, otherwise this is a restraint and must follow restraint documentation policy.)    DVT prophylaxis:  DVT prophylaxis Med-   DVT prophylaxis SCD or VIJAYA-      Wounds: (If Applicable)  Wounds-   Location     Active Consults:  IP CONSULT TO HOSPITALIST  IP CONSULT TO NEUROLOGY    Length of Stay:  Expected LOS: - - -  Actual LOS: 1  Discharge Plan:        Greg Chung RN No

## 2022-11-01 NOTE — PROGRESS NOTES
Problem: Falls - Risk of  Goal: *Absence of Falls  Description: Document Claribel Litter Fall Risk and appropriate interventions in the flowsheet.   Outcome: Progressing Towards Goal  Note: Fall Risk Interventions:  Mobility Interventions: Bed/chair exit alarm         Medication Interventions: Bed/chair exit alarm, Teach patient to arise slowly                   Problem: Patient Education: Go to Patient Education Activity  Goal: Patient/Family Education  Outcome: Progressing Towards Goal     Problem: Patient Education: Go to Patient Education Activity  Goal: Patient/Family Education  Outcome: Progressing Towards Goal     Problem: TIA/CVA Stroke: 0-24 hours  Goal: Off Pathway (Use only if patient is Off Pathway)  Outcome: Progressing Towards Goal  Goal: Activity/Safety  Outcome: Progressing Towards Goal  Goal: Consults, if ordered  Outcome: Progressing Towards Goal  Goal: Diagnostic Test/Procedures  Outcome: Progressing Towards Goal  Goal: Nutrition/Diet  Outcome: Progressing Towards Goal  Goal: Discharge Planning  Outcome: Progressing Towards Goal  Goal: Medications  Outcome: Progressing Towards Goal  Goal: Respiratory  Outcome: Progressing Towards Goal  Goal: Treatments/Interventions/Procedures  Outcome: Progressing Towards Goal  Goal: Minimize risk of bleeding post-thrombolytic infusion  Outcome: Progressing Towards Goal  Goal: Monitor for complications post-thrombolytic infusion  Outcome: Progressing Towards Goal  Goal: Psychosocial  Outcome: Progressing Towards Goal  Goal: *Hemodynamically stable  Outcome: Progressing Towards Goal  Goal: *Neurologically stable  Description: Absence of additional neurological deficits    Outcome: Progressing Towards Goal  Goal: *Verbalizes anxiety and depression are reduced or absent  Outcome: Progressing Towards Goal  Goal: *Absence of Signs of Aspiration on Current Diet  Outcome: Progressing Towards Goal  Goal: *Absence of deep venous thrombosis signs and symptoms(Stroke Metric)  Outcome: Progressing Towards Goal  Goal: *Ability to perform ADLs and demonstrates progressive mobility and function  Outcome: Progressing Towards Goal  Goal: *Stroke education started(Stroke Metric)  Outcome: Progressing Towards Goal  Goal: *Dysphagia screen performed(Stroke Metric)  Outcome: Progressing Towards Goal  Goal: *Rehab consulted(Stroke Metric)  Outcome: Progressing Towards Goal     Problem: TIA/CVA Stroke: Day 2 Until Discharge  Goal: Off Pathway (Use only if patient is Off Pathway)  Outcome: Progressing Towards Goal  Goal: Activity/Safety  Outcome: Progressing Towards Goal  Goal: Diagnostic Test/Procedures  Outcome: Progressing Towards Goal  Goal: Nutrition/Diet  Outcome: Progressing Towards Goal  Goal: Discharge Planning  Outcome: Progressing Towards Goal  Goal: Medications  Outcome: Progressing Towards Goal  Goal: Respiratory  Outcome: Progressing Towards Goal  Goal: Treatments/Interventions/Procedures  Outcome: Progressing Towards Goal  Goal: Psychosocial  Outcome: Progressing Towards Goal  Goal: *Verbalizes anxiety and depression are reduced or absent  Outcome: Progressing Towards Goal  Goal: *Absence of aspiration  Outcome: Progressing Towards Goal  Goal: *Absence of deep venous thrombosis signs and symptoms(Stroke Metric)  Outcome: Progressing Towards Goal  Goal: *Optimal pain control at patient's stated goal  Outcome: Progressing Towards Goal  Goal: *Tolerating diet  Outcome: Progressing Towards Goal  Goal: *Ability to perform ADLs and demonstrates progressive mobility and function  Outcome: Progressing Towards Goal  Goal: *Stroke education continued(Stroke Metric)  Outcome: Progressing Towards Goal     Problem: Ischemic Stroke: Discharge Outcomes  Goal: *Verbalizes anxiety and depression are reduced or absent  Outcome: Progressing Towards Goal  Goal: *Verbalize understanding of risk factor modification(Stroke Metric)  Outcome: Progressing Towards Goal  Goal: *Hemodynamically stable  Outcome: Progressing Towards Goal  Goal: *Absence of aspiration pneumonia  Outcome: Progressing Towards Goal  Goal: *Aware of needed dietary changes  Outcome: Progressing Towards Goal  Goal: *Verbalize understanding of prescribed medications including anti-coagulants, anti-lipid, and/or anti-platelets(Stroke Metric)  Outcome: Progressing Towards Goal  Goal: *Tolerating diet  Outcome: Progressing Towards Goal  Goal: *Aware of follow-up diagnostics related to anticoagulants  Outcome: Progressing Towards Goal  Goal: *Ability to perform ADLs and demonstrates progressive mobility and function  Outcome: Progressing Towards Goal  Goal: *Absence of DVT(Stroke Metric)  Outcome: Progressing Towards Goal  Goal: *Absence of aspiration  Outcome: Progressing Towards Goal  Goal: *Optimal pain control at patient's stated goal  Outcome: Progressing Towards Goal  Goal: *Home safety concerns addressed  Outcome: Progressing Towards Goal  Goal: *Describes available resources and support systems  Outcome: Progressing Towards Goal  Goal: *Verbalizes understanding of activation of EMS(911) for stroke symptoms(Stroke Metric)  Outcome: Progressing Towards Goal  Goal: *Understands and describes signs and symptoms to report to providers(Stroke Metric)  Outcome: Progressing Towards Goal  Goal: *Neurolgocially stable (absence of additional neurological deficits)  Outcome: Progressing Towards Goal  Goal: *Verbalizes importance of follow-up with primary care physician(Stroke Metric)  Outcome: Progressing Towards Goal  Goal: *Smoking cessation discussed,if applicable(Stroke Metric)  Outcome: Progressing Towards Goal  Goal: *Depression screening completed(Stroke Metric)  Outcome: Progressing Towards Goal

## 2022-11-01 NOTE — PROGRESS NOTES
OCCUPATIONAL THERAPY EVALUATION/DISCHARGE  Patient: Segundo Culp (38 y.o. male)  Date: 11/1/2022  Primary Diagnosis: Acute CVA (cerebrovascular accident) [facility]) [I63.9]       Precautions: Fall       ASSESSMENT  Pt presented sitting upright in chair. Pt presents to be at his independent baseline for UB/LB dressing, grooming, toileting, self-feeding tasks, and at his modified independent baseline for bathing. Based on the objective data described below, the patient presents with general weakness, decreased UE fine motor coordination, and a RLE coordination issue while ambulating. While seated in chair performing [id number], pt did exhibit some issues with timing and dysmetria with RUE finger to nose testing and when reaching for applesauce cup. Pt did state that since his CVA, writing his name, using a pen/pencil, and using a fork when eating has become more difficult. Pt did not ambulate with a RW on this date. While ambulating to the bathroom pt did exhibit some coordination issues with RLE, no LOB noted. While performing standing grooming tasks at the sink and donning hospital gown, pt did not demonstrate any LOB or unsteadiness, nor while seated in chair donning/doffing socks. Pt displays good use of affected RUE when performing ADLs on this date, however, pt may need more RUE rehab when discharged from [facility] via outpatient OT. Pt does not require any further acute care OT services at this time. Recommend pt be discharged to home with assistance of family PRN and HHOT and PT services. Functional Outcome Measure: The patient scored 64/66 on the [address] outcome measure which is indicative of having mild deficits from his most recent CVA. PLAN :    Recommendation for discharge: (in order for the patient to meet his/her long term goals)  Discharge from acute care OT services at this time.  HHOT/PT at least 2 days of the week     This discharge recommendation:  Has not yet been discussed the attending provider and/or case management    IF patient discharges home will need the following DME: none       SUBJECTIVE:   Patient stated Seth Wood was dunking a basketball on Friday and couldn't walk on Saturday.     OBJECTIVE DATA SUMMARY:   HISTORY:   No past medical history on file. No past surgical history on file. Prior Level of Function/Environment/Context: Prior to admission the pt was independent with all ADLs and with mobility. Lives at home with his 4 children and partner. Pt likes sports. Expanded or extensive additional review of patient history:   Home Situation  Home Environment: Private residence  # Steps to Enter: 3  Rails to Enter: Yes  One/Two Story Residence: Split level  # of Interior Steps: 6  Interior Rails: Right  Lift Chair Available: No  Living Alone: No  Support Systems: Spouse/Significant Other, Child(belem) (4 young children)  Patient Expects to be Discharged to[de-identified] Home (f/u apts)  Current DME Used/Available at Home: Shower chair  Tub or Shower Type: Shower    Hand dominance: Right    EXAMINATION OF PERFORMANCE DEFICITS:  Cognitive/Behavioral Status:  Neurologic State: Alert  Orientation Level: Oriented X4  Cognition: Appropriate decision making  Perception: Appears intact  Perseveration: No perseveration noted  Safety/Judgement: Awareness of environment; Insight into deficits      Hearing:   Auditory  Auditory Impairment: None    Vision/Perceptual:    Corrective Lenses: Glasses  Grossly WFL    Range of Motion:  AROM: Generally decreased, functional RUE and RLE, WNL for LUE and LLE       Strength:  Strength: Generally decreased, functional RUE and RLE, WNL for LUE and LLE      Coordination:  Coordination: Generally decreased, functional RUE and RLE, WNL for LUE and LLE          Tone & Sensation:  Tone: Normal  Sensation:  (Pt stated that he did not have any impaired numbness or tingling)  BUE proprioception is intact  Balance:  Sitting: Intact  Standing: Impaired  Standing - Static: Fair  Standing - Dynamic : Fair    Functional Mobility and Transfers for ADLs:  Bed Mobility:  Supine to Sit: Modified independent;Bed Modified  Scooting: Modified independent    Transfers:  Sit to Stand: Stand-by assistance  Stand to Sit: Stand-by assistance  Bed to Chair: Stand-by assistance  Bathroom Mobility: Stand-by assistance    ADL Assessment:  Feeding: Independent    Oral Facial Hygiene/Grooming: Independent    Bathing: Modified independent, if using his shower chair. SBA without use of shower seat. Type of Bath: Chlorhexidine (CHG)    Upper Body Dressing: Independent    Lower Body Dressing: Independent    Toileting: Independent        Cognitive Retraining  Safety/Judgement: Awareness of environment; Insight into deficits, fall prevention     Therapeutic Exercise:  Pt was instructed to use R hand for all functional tasks. Additionally, to ask for writing utensil and paper to practice hand writing since pt felt that his handwriting has become affected since his CVA. Functional Measure:  Fugl-Miranda Assessment of Motor Recovery after Stroke:          Reflex Activity  Flexors/Biceps/Fingers: Can be elicited  Extensors/Triceps: Can be elicited  Reflex Subtotal: 4    Volitional Movement Within Synergies  Shoulder Retraction: Full  Shoulder Elevation: Full  Shoulder Abduction (90 degrees): Full  Shoulder External Rotation: Full  Elbow Flexion: Full  Forearm Supination: Full  Shoulder Adduction/Internal Rotation: Full  Elbow Extension: Full  Forearm Pronation: Full  Subtotal: 18    Volitional Movement Mixing Synergies  Hand to Lumbar Spine: Full  Shoulder Flexion (0-90 degrees): Full  Pronation-Supination: Full  Subtotal: 6    Volitional Movement With Little or No Synergy  Shoulder Abduction (0-90 degrees): Full  Shoulder Flexion ( degrees): Full  Pronation/Supination: Full  Subtotal : 6    Normal Reflex Activity  Biceps, Triceps, Finger Flexors:  Full  Subtotal : 2    Upper Extremity Total   Upper Extremity Total: 36    Wrist  Stability at 15 Degree Dorsiflexion: Full  Repeated Dorsiflexion/ Volar Flexion: Full  Stability at 15 Degree Dorsiflexion: Full  Repeated Dorsiflexion/ Volar Flexion: Full  Circumduction: Full  Wrist Total: 10    Hand  Mass Flexion: Full  Mass Extension: Full  Grasp A: Full  Grasp B: Full  Grasp C: Full  Grasp D: Full  Grasp E: Full  Hand Total: 14    Coordination/Speed  Tremor: None  Dysmetria: Slight  Time: 2-5s  Coordination/Speed Total : 4    Total A-D  Total A-D (Motor Function): 64/66         This is a reliable/valid measure of arm function after a neurological event. It has established value to characterize functional status and for measuring spontaneous and therapy-induced recovery; tests proximal and distal motor functions. Fugl-Miranda Assessment - UE scores recorded between five and 30 days post neurologic event can be used to predict UE recovery at six months post neurologic event. Severe = 0-21 points   Moderately Severe = 22-33 points   Moderate = 34-47 points   Mild = 48-66 points  FLAVIA Mendoza, THEODORE Jerome, & DEBORA Spicer (1992). Measurement of motor recovery after stroke: Outcome assessment and sample size requirements.  Stroke, 23, pp. 3338-4037.   --------------------------------------------------------------------------------------------------------------------------------------------------------------------  MCID:  Stroke:   Luh Herr, 2001; n = 171; mean age 79 (5) years; assessed within 16 (12) days of stroke, Acute Stroke)  FMA Motor Scores from Admission to Discharge   10 point increase in FMA Upper Extremity = 1.5 change in discharge FIM   10 point increase in FMA Lower Extremity = 1.9 change in discharge FIM  MDC:   Stroke:   Mario Vickers et al, 2008, n = 14, mean age = 59.9 (14.6) years, assessed on average 14 (6.5) months post stroke, Chronic Stroke)   FMA = 5.2 points for the Upper Extremity portion of the assessment Occupational Therapy Evaluation Charge Determination   History Examination Decision-Making   LOW Complexity : Brief history review  LOW Complexity : 1-3 performance deficits relating to physical, cognitive , or psychosocial skils that result in activity limitations and / or participation restrictions  LOW Complexity : No comorbidities that affect functional and no verbal or physical assistance needed to complete eval tasks       Based on the above components, the patient evaluation is determined to be of the following complexity level: LOW   Pain Rating:  Pt stated no pain on this date    Activity Tolerance:   Good    After treatment patient left in no apparent distress:    Sitting in chair, Call bell within reach, and Bed / chair alarm activated    COMMUNICATION/EDUCATION:   The patients plan of care was discussed with: Physical therapist and Occupational therapist.     Thank you for this referral.  Alden Blanco OT  Time Calculation: 48 mins

## 2022-11-01 NOTE — PROGRESS NOTES
Problem: Mobility Impaired (Adult and Pediatric)  Goal: *Acute Goals and Plan of Care (Insert Text)  Description:   FUNCTIONAL STATUS PRIOR TO ADMISSION: Patient was independent and active without use of DME.    HOME SUPPORT PRIOR TO ADMISSION: The patient lived with spouse and children but did not require assist.    Physical Therapy Goals  Initiated 11/1/2022  1. Patient will move from supine to sit and sit to supine  in bed with independence within 7 day(s). 2.  Patient will transfer from bed to chair and chair to bed with independence using the least restrictive device within 7 day(s). 3.  Patient will perform sit to stand with independence within 7 day(s). 4.  Patient will ambulate with independence for 120 feet with the least restrictive device within 7 day(s). 5.  Patient will ascend/descend 6 stairs with 1 handrail(s) with modified independence within 7 day(s). 6.  Patient will improve De La Cruz Balance score by 7 points within 7 days. Outcome: Progressing Towards Goal   PHYSICAL THERAPY EVALUATION- NEURO POPULATION  Patient: Steff Corrales (86 y.o. male)  Date: 11/1/2022  Primary Diagnosis: Acute CVA (cerebrovascular accident) [facility]) [I63.9]       Precautions:          ASSESSMENT  Based on the objective data described below, the patient presents with decreased balance, coordination and independence with functional mobility S/P admission for CVA. MRI indicates L basal ganglia infarct. Patient education is provided on [facility]. His balance is assessed via the DE LA CRUZ balance assessment. He demonstrates R UE/LE weakness and discoordination as evidenced by heel/shin test. He demonstrates the ability to ambulate with Min A for stability intermittently hyperextending the R LE in gait. When patient decreases his speed he demonstrates improved LE stability. He requires increased assistance with increasing gait distance demonstrating increased fatigue and poor endurance.   Patient stands with increased MONICA and is unable to tolerate single leg stance. The R LE demonstrates decreased DF and supination at rest.     Current Level of Function Impacting Discharge (mobility/balance): Min A     Functional Outcome Measure: The patient scored Total: 20/56 on the Thornton Balance Assessment which is indicative of high fall risk. Other factors to consider for discharge: medical stability, PLOF- independent and active, 4 young children      Patient will benefit from skilled therapy intervention to address the above noted impairments. PLAN :  Recommendations and Planned Interventions: bed mobility training, transfer training, gait training, therapeutic exercises, neuromuscular re-education, edema management/control, patient and family training/education, and therapeutic activities      Frequency/Duration: Patient will be followed by physical therapy:  5 times a week to address goals. Recommendation for discharge: (in order for the patient to meet his/her long term goals)  Therapy 3 hours per day 5-7 days per week    This discharge recommendation:  Has not yet been discussed the attending provider and/or case management    IF patient discharges home will need the following DME: to be determined (TBD)         SUBJECTIVE:   Patient stated I want to do whatever I need to to get better.     OBJECTIVE DATA SUMMARY:   HISTORY:    No past medical history on file. No past surgical history on file.     Personal factors and/or comorbidities impacting plan of care: please see above    Home Situation  Home Environment: Private residence  # Steps to Enter: 3  Rails to Enter: Yes  One/Two Story Residence: Split level  # of Interior Steps: 6  Interior Rails: Right  Lift Chair Available: No  Living Alone: No  Support Systems: Spouse/Significant Other, Child(belem) (4 young children)  Patient Expects to be Discharged to[de-identified] Home (f/u apts)  Current DME Used/Available at Home: Shower chair  Tub or Shower Type: Shower    EXAMINATION/PRESENTATION/DECISION MAKING:   Critical Behavior:  Neurologic State: Alert  Orientation Level: Oriented X4  Cognition: Appropriate decision making, Appropriate for age attention/concentration, Appropriate safety awareness, Follows commands  Safety/Judgement: Awareness of environment, Insight into deficits  Hearing:     Skin:    Edema:   Range Of Motion:  AROM: Generally decreased, functional           PROM: Generally decreased, functional           Strength:    Strength: Generally decreased, functional                    Tone & Sensation:   Tone: Normal              Sensation: Impaired               Coordination:  Coordination: Generally decreased, functional  Vision:      Functional Mobility:  Bed Mobility:     Supine to Sit: Modified independent;Bed Modified     Scooting: Modified independent  Transfers:  Sit to Stand: Contact guard assistance  Stand to Sit: Contact guard assistance        Bed to Chair: Minimum assistance              Balance:   Sitting: Intact; Without support  Standing: Impaired; Without support  Standing - Static: Constant support; Fair  Standing - Dynamic : Constant support; Fair  Ambulation/Gait Training:  Distance (ft): 20 Feet (ft)  Assistive Device: Gait belt  Ambulation - Level of Assistance: Minimal assistance        Gait Abnormalities: Decreased step clearance; Altered arm swing;Trunk sway increased; Foot drop        Base of Support: Widened  Stance: Right decreased  Speed/Fela: Slow  Step Length: Right shortened  Swing Pattern: Right asymmetrical                  Stairs:               Therapeutic Exercises:       Functional Measure  Thornton Balance Test:    Sitting to Standing: 3  Standing Unsupported: 3  Sitting with Back Unsupported: 4  Standing to Sittin  Transfers: 2  Standing Unsupported with Eyes Closed: 1  Standing Unsupported with Feet Together: 0  Reach Forward with Outstretched Arm: 2   Object: 0  Turn to Look Over Shoulders: 2  Turn 360 Degrees: 1  Alternate Foot on Step/Stool: 0  Standing Unsupported One Foot in Front: 0  Stand on One Le  Total: 20/56         56=Maximum possible score;   0-20=High fall risk  21-40=Moderate fall risk   41-56=Low fall risk        Physical Therapy Evaluation Charge Determination   History Examination Presentation Decision-Making   MEDIUM  Complexity : 1-2 comorbidities / personal factors will impact the outcome/ POC  LOW Complexity : 1-2 Standardized tests and measures addressing body structure, function, activity limitation and / or participation in recreation  MEDIUM Complexity : Evolving with changing characteristics  Other outcome measures DE LA CRUZ  HIGH       Based on the above components, the patient evaluation is determined to be of the following complexity level: MEDIUM    Pain Rating:      Activity Tolerance:   Good    After treatment patient left in no apparent distress:   Sitting in chair, Call bell within reach, and Bed / chair alarm activated    COMMUNICATION/EDUCATION:   The patients plan of care was discussed with: Occupational therapist and Registered nurse. Patient was educated regarding his deficit(s) of R UE/LE weakness and decreased coordination as this relates to his diagnosis of CVA. He demonstrated Good understanding    Patient and/or family was verbally educated on the BE FAST acronym for signs/symptoms of CVA and TIA. Informed patient to refer to the Stroke Binder for further BE FAST information. All questions answered with patient indicating good understanding. Fall prevention education was provided and the patient/caregiver indicated understanding., Patient/family have participated as able in goal setting and plan of care. , and Patient/family agree to work toward stated goals and plan of care.     Thank you for this referral.  Jose M Winston, PT   Time Calculation: 41 mins

## 2022-11-01 NOTE — PROGRESS NOTES
Hospitalist Progress Note    NAME: Tierra Medina   :  1955   MRN:  [id number]     Admit date: 10/31/2022    Today's date: 22    PCP: None    Anticipated discharge date:2022    Barriers:  Echo and PT/OT recs still pending    Assessment / Plan:    Small acute infarct left basal ganglia region POA  - Presented with right weakness x days  - CT head is within normal limits  - MRI brain read by radiology  Small acute infarct left basal ganglia region  - Carotid doppler Less than 50% ICA stenosis bilaterally  - Echo pending  - Telemetry reviewed, NSR, no atrial fib/flutter  - LDL 88, I ordered statin  - HgBa1c 5.2  - Aspirin 81 mg daily  - Consult PT OT.    - No smoking, discussed with patient at length  - Hopefully discharge in AM     Essential HTN POA  -Not seen a physician in many years  -Norvasc 5 mg started, still uncontrolled, Increase norvasc to 10 mg in AM  -/100 while in the ED consistent with hypertensive urgency  -Seen symptoms started more than 3 days ago we will start him on antihypertensives    Hyperlipidemia POA  - LDL 88  - lipitor    Tobacco use POA  - Counseled patient re importance of quitting    Overweight POA Body mass index is 26.38 kg/m². Code Status: Full code  Surrogate Decision Maker:     DVT Prophylaxis: Lovenox  GI Prophylaxis: not indicated     Baseline: From home, independent of ADLs    Subjective:     Chief Complaint / Reason for Physician Visit  \"My right side still feels uncoordinated\". Discussed with RN events overnight.    No complaints, right weakness still not back to normal  No HA or CP or SOB or Cough    Review of Systems:  Symptom Y/N Comments  Symptom Y/N Comments   Fever/Chills n   Chest Pain n    Poor Appetite    Edema     Cough n   Abdominal Pain n    Sputum    Joint Pain     SOB/MARTINEZ n   Headache     Nausea/vomit n   Tolerating PT/OT     Diarrhea n   Tolerating Diet y    Constipation    Other       Could NOT obtain due to: Objective:     VITALS:   Last 24hrs VS reviewed since prior progress note. Most recent are:  Patient Vitals for the past 24 hrs:   Temp Pulse Resp BP SpO2   11/01/22 1150 -- (!) 58 -- -- --   11/01/22 1136 97.8 °F (36.6 °C) 65 18 (!) 148/102 96 %   11/01/22 0747 97.7 °F (36.5 °C) 66 20 (!) 154/98 96 %   11/01/22 0308 97.8 °F (36.6 °C) 70 20 (!) 163/85 94 %   10/31/22 2310 98.3 °F (36.8 °C) 77 20 (!) 164/99 94 %   10/31/22 1923 97.9 °F (36.6 °C) 78 18 (!) 161/92 98 %   10/31/22 1532 -- -- -- (!) 187/100 --   10/31/22 1532 -- 61 12 (!) 171/96 100 %   10/31/22 1500 -- 74 18 (!) 187/114 99 %     No intake or output data in the 24 hours ending 11/01/22 1424     Wt Readings from Last 12 Encounters:   10/31/22 93.2 kg (205 lb 7.5 oz)       PHYSICAL EXAM:    I had a face to face encounter and independently examined this patient on 11/01/22 as outlined below:    General: WD, WN. Alert, cooperative, no acute distress    EENT:  PERRL. Anicteric sclerae. MMM  Resp:  CTA bilaterally, no wheezing or rales. No accessory muscle use  CV:  Regular  rhythm,  No edema  GI:  Soft, Non distended, Non tender. +Bowel sounds, no rebound  Neurologic:  Alert and oriented X 3, normal speech, minimal; right weakness, no drift  Psych:   Good insight. Not anxious nor agitated  Skin:  No rashes. No jaundice    Reviewed most current lab test results and cultures  YES  Reviewed most current radiology test results   YES  Review and summation of old records today    NO  Reviewed patient's current orders and MAR    YES  PMH/SH reviewed - no change compared to H&P  ________________________________________________________________________  Care Plan discussed with:    Comments   Patient x    Family      RN x    Care Manager     Consultant                        Multidiciplinary team rounds were held today with , nursing, pharmacist and clinical coordinator.   Patient's plan of care was discussed; medications were reviewed and discharge planning was addressed. ________________________________________________________________________      Comments   >50% of visit spent in counseling and coordination of care     ________________________________________________________________________  Lai Townsend MD     Procedures: see electronic medical records for all procedures/Xrays and details which were not copied into this note but were reviewed prior to creation of Plan. LABS:  I reviewed today's most current labs and imaging studies.   Pertinent labs include:  Recent Labs     11/01/22  0109 10/31/22  1235   WBC 4.9 5.1   HGB 15.8 16.6   HCT 47.2 49.2    201     Recent Labs     11/01/22  0109 10/31/22  1235    140   K 3.9 4.2    108   CO2 27 29   * 106*   BUN 8 8   CREA 0.91 1.00   CA 8.9 9.2   ALB  --  3.9   TBILI  --  0.6   ALT  --  33   INR  --  1.0

## 2022-11-01 NOTE — PROGRESS NOTES
End of Shift Note     Bedside shift change report given to [facility] [employer] [facility] [employer], RN (oncoming nurse) by Janey Carmichael (offgoing nurse). Report included the following information      Shift worked:  7a-7p   Shift summary and any significant changes:      No acute changes. RLE weakness. Sensation intact. Carotid doppler complete. Echo to be completed. PT/OT eval complete. Concerns for physician to address:     Zone phone for oncoming shift:         Patient Information  Chasity Rowland  74 y.o.  10/31/2022  1:58 PM by Carlos Ayala MD. Chasity Rowland was admitted from      Problem List       Patient Active Problem List     Diagnosis Date Noted    Acute CVA (cerebrovascular accident) ([facility] [address] 10/31/2022      No past medical history on file. Core Measures:  CVA:  Y  CHF: N  PNA: N     Activity:  Activity Level: Up with Assistance  Number times ambulated in hallways past shift:   Number of times OOB to chair past shift:      Cardiac:   Cardiac Monitoring:          Access:   Current line(s):    Central Line? Placement date  Reason Medically Necessary   PICC LINE? Placement date Reason Medically Necessary      Genitourinary:   Urinary status:   Urinary Catheter? Placement Date  Reason Medically Necessary      Respiratory:   O2 Device: None (Room air)  Chronic home O2 use?:   Incentive spirometer at bedside:      GI:  Current diet:  ADULT DIET Regular  DIET ONE TIME MESSAGE  Passing flatus: Tolerating current diet:      Pain Management:   Patient states pain is manageable on current regimen:      Skin:  Ger Score: 21  Interventions:     Patient Safety:  Fall Score:  Total Score: 2  Interventions:   @Rollbelt  @dexterity to release roll belt  Yes/No ( must document dexterity  here by stating Yes or No here, otherwise this is a restraint and must follow restraint documentation policy.)     DVT prophylaxis:  DVT prophylaxis Med- lovenox  DVT prophylaxis SCD or VIJAYA-       Wounds: (If Applicable)  Wounds-   Location      Active Consults:  IP CONSULT TO HOSPITALIST  IP CONSULT TO NEUROLOGY     Length of Stay:  Expected LOS: - - -  Actual LOS: 1  Discharge Plan:

## 2022-11-02 ENCOUNTER — APPOINTMENT (OUTPATIENT)
Dept: NON INVASIVE DIAGNOSTICS | Age: 67
DRG: 065 | End: 2022-11-02
Attending: INTERNAL MEDICINE
Payer: MEDICARE

## 2022-11-02 LAB
ATRIAL RATE: 55 BPM
CALCULATED P AXIS, ECG09: -2 DEGREES
CALCULATED R AXIS, ECG10: 45 DEGREES
CALCULATED T AXIS, ECG11: 51 DEGREES
DIAGNOSIS, 93000: NORMAL
ECHO AO ASC DIAM: 2.8 CM
ECHO AO ASCENDING AORTA INDEX: 1.27 CM/M2
ECHO AO ROOT DIAM: 3 CM
ECHO AO ROOT INDEX: 1.36 CM/M2
ECHO AV AREA PEAK VELOCITY: 2.7 CM2
ECHO AV AREA VTI: 2.5 CM2
ECHO AV AREA/BSA PEAK VELOCITY: 1.2 CM2/M2
ECHO AV AREA/BSA VTI: 1.1 CM2/M2
ECHO AV MEAN GRADIENT: 4 MMHG
ECHO AV MEAN VELOCITY: 1 M/S
ECHO AV PEAK GRADIENT: 9 MMHG
ECHO AV PEAK VELOCITY: 1.5 M/S
ECHO AV VELOCITY RATIO: 0.73
ECHO AV VTI: 32.9 CM
ECHO LA DIAMETER INDEX: 1.36 CM/M2
ECHO LA DIAMETER: 3 CM
ECHO LA TO AORTIC ROOT RATIO: 1
ECHO LA VOL 4C: 26 ML (ref 18–58)
ECHO LA VOLUME INDEX A4C: 12 ML/M2 (ref 16–34)
ECHO LV E' LATERAL VELOCITY: 8 CM/S
ECHO LV E' SEPTAL VELOCITY: 6 CM/S
ECHO LV EDV A4C: 133 ML
ECHO LV EDV INDEX A4C: 60 ML/M2
ECHO LV EJECTION FRACTION A4C: 36 %
ECHO LV ESV A4C: 85 ML
ECHO LV ESV INDEX A4C: 39 ML/M2
ECHO LV FRACTIONAL SHORTENING: 17 % (ref 28–44)
ECHO LV INTERNAL DIMENSION DIASTOLE INDEX: 2.14 CM/M2
ECHO LV INTERNAL DIMENSION DIASTOLIC: 4.7 CM (ref 4.2–5.9)
ECHO LV INTERNAL DIMENSION SYSTOLIC INDEX: 1.77 CM/M2
ECHO LV INTERNAL DIMENSION SYSTOLIC: 3.9 CM
ECHO LV IVSD: 1.4 CM (ref 0.6–1)
ECHO LV MASS 2D: 251.4 G (ref 88–224)
ECHO LV MASS INDEX 2D: 114.3 G/M2 (ref 49–115)
ECHO LV POSTERIOR WALL DIASTOLIC: 1.3 CM (ref 0.6–1)
ECHO LV RELATIVE WALL THICKNESS RATIO: 0.55
ECHO LVOT AREA: 3.8 CM2
ECHO LVOT AV VTI INDEX: 0.67
ECHO LVOT DIAM: 2.2 CM
ECHO LVOT MEAN GRADIENT: 3 MMHG
ECHO LVOT PEAK GRADIENT: 4 MMHG
ECHO LVOT PEAK VELOCITY: 1.1 M/S
ECHO LVOT STROKE VOLUME INDEX: 38.3 ML/M2
ECHO LVOT SV: 84.3 ML
ECHO LVOT VTI: 22.2 CM
ECHO MV A VELOCITY: 0.68 M/S
ECHO MV E DECELERATION TIME (DT): 256.2 MS
ECHO MV E VELOCITY: 0.62 M/S
ECHO MV E/A RATIO: 0.91
ECHO MV E/E' LATERAL: 7.75
ECHO MV E/E' RATIO (AVERAGED): 9.04
ECHO MV E/E' SEPTAL: 10.33
ECHO RV FREE WALL PEAK S': 12 CM/S
ECHO RV INTERNAL DIMENSION: 3.3 CM
P-R INTERVAL, ECG05: 118 MS
Q-T INTERVAL, ECG07: 416 MS
QRS DURATION, ECG06: 92 MS
QTC CALCULATION (BEZET), ECG08: 397 MS
VENTRICULAR RATE, ECG03: 55 BPM

## 2022-11-02 PROCEDURE — 74011250637 HC RX REV CODE- 250/637: Performed by: INTERNAL MEDICINE

## 2022-11-02 PROCEDURE — 74011250636 HC RX REV CODE- 250/636: Performed by: INTERNAL MEDICINE

## 2022-11-02 PROCEDURE — 97530 THERAPEUTIC ACTIVITIES: CPT

## 2022-11-02 PROCEDURE — 97116 GAIT TRAINING THERAPY: CPT

## 2022-11-02 PROCEDURE — 65270000046 HC RM TELEMETRY

## 2022-11-02 PROCEDURE — 93306 TTE W/DOPPLER COMPLETE: CPT

## 2022-11-02 RX ADMIN — ENOXAPARIN SODIUM 40 MG: 100 INJECTION SUBCUTANEOUS at 14:32

## 2022-11-02 RX ADMIN — AMLODIPINE BESYLATE 10 MG: 5 TABLET ORAL at 09:43

## 2022-11-02 RX ADMIN — ATORVASTATIN CALCIUM 40 MG: 40 TABLET, FILM COATED ORAL at 21:19

## 2022-11-02 RX ADMIN — ASPIRIN 81 MG: 81 TABLET, CHEWABLE ORAL at 09:43

## 2022-11-02 NOTE — PROGRESS NOTES
Hospitalist Progress Note    NAME: Silvina Mead   :  1955   MRN:  [id number]     Admit date: 10/31/2022    Today's date: 22    PCP: None    Anticipated discharge date:2022    Barriers: Patient is medically stable to be discharged to SNF once available. Assessment / Plan:    Small acute infarct left basal ganglia region POA  - Presented with right weakness x days  - CT head is within normal limits  - MRI brain read by radiology  Small acute infarct left basal ganglia region  - Carotid doppler Less than 50% ICA stenosis bilaterally  - Echo with no PFO or thrombus reported  - Telemetry reviewed, NSR, no atrial fib/flutter  - LDL 88, I ordered statin  - HgBa1c 5.2  - Aspirin 81 mg daily  - No smoking, discussed with patient at length  -PT OT recommending SNF, patient is medically stable to be discharged to SNF once available. Essential HTN POA  -Not seen a physician in many years  -Norvasc 5 mg started, still uncontrolled, Increase norvasc to 10 mg in AM  -/100 while in the ED consistent with hypertensive urgency  -Seen symptoms started more than 3 days ago we will start him on antihypertensives    Hyperlipidemia POA  - LDL 88  - lipitor    Tobacco use POA  - Counseled patient re importance of quitting    Overweight POA Body mass index is 26.32 kg/m². Code Status: Full code  Surrogate Decision Maker:     DVT Prophylaxis: Lovenox  GI Prophylaxis: not indicated     Baseline: From home, independent of ADLs    Subjective:     Patient was seen and examined. No acute events overnight. Discussed with RN overnight events. All patient's questions were answered.     \"I had very good night\"    Review of Systems:  Symptom Y/N Comments  Symptom Y/N Comments   Fever/Chills n   Chest Pain n    Poor Appetite    Edema     Cough n   Abdominal Pain n    Sputum    Joint Pain     SOB/MARTINEZ n   Headache     Nausea/vomit n   Tolerating PT/OT     Diarrhea n   Tolerating Diet y Constipation    Other       Could NOT obtain due to:      Objective:     VITALS:   Last 24hrs VS reviewed since prior progress note. Most recent are:  Patient Vitals for the past 24 hrs:   Temp Pulse Resp BP SpO2   11/02/22 1200 -- 63 -- -- --   11/02/22 1125 97.3 °F (36.3 °C) 63 18 (!) 151/87 93 %   11/02/22 0831 -- -- -- (!) 157/96 --   11/02/22 0828 -- -- -- -- 96 %   11/02/22 0800 -- 63 -- -- --   11/02/22 0723 97.6 °F (36.4 °C) 63 18 (!) 157/96 96 %   11/02/22 0329 98.2 °F (36.8 °C) 70 16 (!) 154/93 94 %   11/01/22 2338 98.5 °F (36.9 °C) 76 18 (!) 141/90 97 %   11/01/22 1925 97.9 °F (36.6 °C) 73 17 (!) 161/97 93 %   11/01/22 1600 -- 66 -- -- --         Intake/Output Summary (Last 24 hours) at 11/2/2022 1524  Last data filed at 11/1/2022 2240  Gross per 24 hour   Intake --   Output 450 ml   Net -450 ml          Wt Readings from Last 12 Encounters:   11/02/22 93 kg (205 lb)       PHYSICAL EXAM:    I had a face to face encounter and independently examined this patient on 11/02/22 as outlined below:    General: WD, WN. Alert, cooperative, no acute distress    EENT:  PERRL. Anicteric sclerae. MMM  Resp:  CTA bilaterally, no wheezing or rales. No accessory muscle use  CV:  Regular  rhythm,  No edema  GI:  Soft, Non distended, Non tender. +Bowel sounds, no rebound  Neurologic:  Alert and oriented X 3, normal speech, minimal right side weakness, no drift, pupils are reactive bilaterally  Psych:   Good insight. Not anxious nor agitated  Skin:  No rashes.   No jaundice    Reviewed most current lab test results and cultures  YES  Reviewed most current radiology test results   YES  Review and summation of old records today    NO  Reviewed patient's current orders and MAR    YES  PMH/SH reviewed - no change compared to H&P  ________________________________________________________________________  Care Plan discussed with:    Comments   Patient x    Family      RN x    Care Manager     Consultant Multidiciplinary team rounds were held today with , nursing, pharmacist and clinical coordinator. Patient's plan of care was discussed; medications were reviewed and discharge planning was addressed. ________________________________________________________________________      Comments   >50% of visit spent in counseling and coordination of care     ________________________________________________________________________  Jose A Vigil MD     Procedures: see electronic medical records for all procedures/Xrays and details which were not copied into this note but were reviewed prior to creation of Plan. LABS:  I reviewed today's most current labs and imaging studies.   Pertinent labs include:  Recent Labs     11/01/22  0109 10/31/22  1235   WBC 4.9 5.1   HGB 15.8 16.6   HCT 47.2 49.2    201       Recent Labs     11/01/22  0109 10/31/22  1235    140   K 3.9 4.2    108   CO2 27 29   * 106*   BUN 8 8   CREA 0.91 1.00   CA 8.9 9.2   ALB  --  3.9   TBILI  --  0.6   ALT  --  33   INR  --  1.0

## 2022-11-02 NOTE — PROGRESS NOTES
Problem: Mobility Impaired (Adult and Pediatric)  Goal: *Acute Goals and Plan of Care (Insert Text)  Description:   FUNCTIONAL STATUS PRIOR TO ADMISSION: Patient was independent and active without use of DME.    HOME SUPPORT PRIOR TO ADMISSION: The patient lived with spouse and children but did not require assist.    Physical Therapy Goals  Initiated 11/1/2022  1. Patient will move from supine to sit and sit to supine  in bed with independence within 7 day(s). 2.  Patient will transfer from bed to chair and chair to bed with independence using the least restrictive device within 7 day(s). 3.  Patient will perform sit to stand with independence within 7 day(s). 4.  Patient will ambulate with independence for 120 feet with the least restrictive device within 7 day(s). 5.  Patient will ascend/descend 6 stairs with 1 handrail(s) with modified independence within 7 day(s). 6.  Patient will improve Thornton Balance score by 7 points within 7 days. 11/2/2022 1254 by Yoni Miller, PT  Outcome: Progressing Towards Goal     PHYSICAL THERAPY TREATMENT  Patient: Catrina Reinoso (89 y.o. male)  Date: 11/2/2022  Diagnosis: Acute CVA (cerebrovascular accident) [facility]) [I63.9] <principal problem not specified>      Precautions:  Fall  Chart, physical therapy assessment, plan of care and goals were reviewed. ASSESSMENT  Patient continues with skilled PT services and is progressing towards goals. Able to progress gait distance in hallway with use of rolling walker and demonstrating improved stability with device. Even with device did have a slight loss of balance during turn into room and needing cues throughout for safe walker use. Without assistive device, patient reaching for environmental support and reports he feels more confident with device. One loss of balance noted during gait without device when patient demonstrating the difficulty he has been having with his R LE, requiring assist from therapist for support. Patient moves slowly and cautiously with focus on R LE during gait as he does have some awareness of his deficits. Occasionally noted to ambulate quicker with rolling walker and notable decreased R LE stability when speed is increased. He was fully independent and active, even playing basketball, prior to acute CVA. He is highly motivated to progress mobility and return to prior level of function. He continues to demonstrate R UE/LE weakness and discoordination as evidenced by heel/shin test but this appears to be improving. Patient stands with increased MONICA and is unable to tolerate single leg stance. The R LE demonstrates decreased DF and supination at rest and R ankle instability in setting of weakness is noted during gait as well. Given high level of prior function, good support, and strong motivation feel patient would continue to benefit from acute inpatient rehab to maximize functional potential especially early on after his CVA given neuroplasticity. If unable to discharge to [facility]- would need nearly 24/7 supervision/assist, HHPT to transition to Neuro Outpatient PT, and rolling walker. Current Level of Function Impacting Discharge (mobility/balance): up to min A with gait    Other factors to consider for discharge: high PLOF - independent and active, 4 young children, high motivation         PLAN :  Patient continues to benefit from skilled intervention to address the above impairments. Continue treatment per established plan of care. to address goals. Recommendation for discharge: (in order for the patient to meet his/her long term goals)  Therapy 3 hours per day 5-7 days per week    This discharge recommendation:  Has been made in collaboration with the attending provider and/or case management    IF patient discharges home will need the following DME: rolling walker     SUBJECTIVE:   Patient stated I want to do whatever is best to get me better.     OBJECTIVE DATA SUMMARY:   Critical Behavior:  Neurologic State: Alert  Orientation Level: Oriented X4  Cognition: Appropriate decision making, Appropriate for age attention/concentration, Appropriate safety awareness, Follows commands  Safety/Judgement: Awareness of environment, Insight into deficits  Functional Mobility Training:  Bed Mobility:  Supine to Sit: Modified independent;Bed Modified  Scooting: Modified independent  Transfers:  Sit to Stand: Stand-by assistance  Stand to Sit: Stand-by assistance  Balance:  Sitting: Intact  Standing: Impaired  Standing - Static: Fair  Standing - Dynamic : Fair  Ambulation/Gait Training:  Distance (ft): 120 Feet (ft)  Assistive Device: Gait belt;Walker, rolling (ambulated ~30 ft in room without assistive device and occasional enviornmental support)  Ambulation - Level of Assistance: Minimal assistance;Contact guard assistance (improved withuse of @)  Gait Abnormalities: Decreased step clearance; Altered arm swing;Trunk sway increased; Foot drop  Base of Support: Widened  Stance: Right decreased  Speed/Fela: Slow  Step Length: Right shortened  Swing Pattern: Right asymmetrical    Pain Rating:  Did not interfere    Activity Tolerance:   Good    After treatment patient left in no apparent distress:   Sitting in chair, Call bell within reach, and Bed / chair alarm activated    COMMUNICATION/COLLABORATION:   The patients plan of care was discussed with: Occupational therapist and Registered nurse. Patient was educated regarding His deficit(s) of R LE>UE weakness/impaired coordination as this relates to His diagnosis of L basal ganglia infarct. He demonstrated Excellent understanding as evidenced by his ability to focus on R LE during gait. Patient and/or family was verbally educated on the BE FAST acronym for signs/symptoms of CVA and TIA. BE FAST was written on patient's communication board  for visual education and reinforcement. All questions answered with patient indicating good understanding. Cony Rendon, PT   Time Calculation: 31 mins

## 2022-11-03 PROCEDURE — 65270000046 HC RM TELEMETRY

## 2022-11-03 PROCEDURE — 97116 GAIT TRAINING THERAPY: CPT | Performed by: PHYSICAL THERAPIST

## 2022-11-03 PROCEDURE — 74011250637 HC RX REV CODE- 250/637: Performed by: INTERNAL MEDICINE

## 2022-11-03 PROCEDURE — 74011250636 HC RX REV CODE- 250/636: Performed by: INTERNAL MEDICINE

## 2022-11-03 RX ADMIN — ASPIRIN 81 MG: 81 TABLET, CHEWABLE ORAL at 08:59

## 2022-11-03 RX ADMIN — ATORVASTATIN CALCIUM 40 MG: 40 TABLET, FILM COATED ORAL at 21:50

## 2022-11-03 RX ADMIN — AMLODIPINE BESYLATE 10 MG: 5 TABLET ORAL at 08:58

## 2022-11-03 RX ADMIN — ENOXAPARIN SODIUM 40 MG: 100 INJECTION SUBCUTANEOUS at 14:42

## 2022-11-03 NOTE — PROGRESS NOTES
End of Shift Note     Bedside shift change report given to Madeline RN (oncoming nurse) by Catharine Litten (offgoing nurse). Report included the following information      Shift worked:  7a-7p   Shift summary and any significant changes:      No acute changes. RLE weakness. Sensation intact. Echo results pending. Concerns for physician to address:     Zone phone for oncoming shift:         Patient Information  Ayaan Romo  74 y.o.  10/31/2022  1:58 PM by Kenyatta Chun MD. Ayaan Romo was admitted from      Problem List          Patient Active Problem List     Diagnosis Date Noted    Acute CVA (cerebrovascular accident) ([facility] [address] 10/31/2022      No past medical history on file. Core Measures:  CVA:  Y  CHF: N  PNA: N     Activity:  Activity Level: Up with Assistance  Number times ambulated in hallways past shift:   Number of times OOB to chair past shift:      Cardiac:   Cardiac Monitoring:          Access:   Current line(s):    Central Line? Placement date  Reason Medically Necessary   PICC LINE? Placement date Reason Medically Necessary      Genitourinary:   Urinary status:   Urinary Catheter? Placement Date  Reason Medically Necessary      Respiratory:   O2 Device: None (Room air)  Chronic home O2 use?:   Incentive spirometer at bedside:      GI:  Current diet:  ADULT DIET Regular  DIET ONE TIME MESSAGE  Passing flatus: Tolerating current diet:      Pain Management:   Patient states pain is manageable on current regimen:      Skin:  Ger Score: 21  Interventions:     Patient Safety:  Fall Score:  Total Score: 2  Interventions:   @Rollbelt  @dexterity to release roll belt  Yes/No ( must document dexterity  here by stating Yes or No here, otherwise this is a restraint and must follow restraint documentation policy.)     DVT prophylaxis:  DVT prophylaxis Med- lovenox  DVT prophylaxis SCD or VIJAYA-       Wounds: (If Applicable)  Wounds-   Location      Active Consults:  IP CONSULT TO HOSPITALIST  IP CONSULT TO NEUROLOGY     Length of Stay:  Expected LOS: - - -  Actual LOS: 2  Discharge Plan:

## 2022-11-03 NOTE — PROGRESS NOTES
Problem: Mobility Impaired (Adult and Pediatric)  Goal: *Acute Goals and Plan of Care (Insert Text)  Description:   FUNCTIONAL STATUS PRIOR TO ADMISSION: Patient was independent and active without use of DME.    HOME SUPPORT PRIOR TO ADMISSION: The patient lived with spouse and children but did not require assist.    Physical Therapy Goals  Initiated 11/1/2022  1. Patient will move from supine to sit and sit to supine  in bed with independence within 7 day(s). 2.  Patient will transfer from bed to chair and chair to bed with independence using the least restrictive device within 7 day(s). 3.  Patient will perform sit to stand with independence within 7 day(s). 4.  Patient will ambulate with independence for 120 feet with the least restrictive device within 7 day(s). 5.  Patient will ascend/descend 6 stairs with 1 handrail(s) with modified independence within 7 day(s). 6.  Patient will improve Thornton Balance score by 7 points within 7 days. Outcome: Progressing Towards Goal   PHYSICAL THERAPY TREATMENT  Patient: Donna Sepulveda (15 y.o. male)  Date: 11/3/2022  Diagnosis: [facility] [I63.9] <principal problem not specified>      Precautions:    Chart, physical therapy assessment, plan of care and goals were reviewed. ASSESSMENT  Patient continues with skilled PT services and is progressing towards goals. Patient overall limited by R hemiparesis, gait instability, impaired balance, ataxia and decreased activity tolerance. Currently needing supervision to come to EOB and has good sitting balance. Sit to stand with Doris-CGA x 2 with cues for technique. Has good immediate standing balance but fair dynamic. Able to amb approx 50 feet x 2 with RW and Doris. Displays difficulty clearing R LE with intermittent foot drop noted. Poor motor control of R LE with gait which makes him at increased risk for falls.   Patient is well below baseline and will benefit from IPR to progress patient to more independent level of function. Anticipate good progress in intense setting. Other factors to consider for discharge: at risk for falls, below baseline         PLAN :  Patient continues to benefit from skilled intervention to address the above impairments. Continue treatment per established plan of care. to address goals. Recommendation for discharge: (in order for the patient to meet his/her long term goals)  Therapy 3 hours per day 5-7 days per week        IF patient discharges home will need the following DME: to be determined (TBD)       SUBJECTIVE:   Patient stated If I have to go home I will need one of these RW.    OBJECTIVE DATA SUMMARY:   Critical Behavior:  Neurologic State: Alert  Orientation Level: Oriented X4  Cognition: Appropriate decision making, Appropriate for age attention/concentration, Appropriate safety awareness, Recognition of people/places, Follows commands  Safety/Judgement: Awareness of environment, Insight into deficits  Functional Mobility Training:  Bed Mobility:     Supine to Sit: Supervision     Scooting: Supervision        Transfers:  Sit to Stand: Contact guard assistance  Stand to Sit: Contact guard assistance                             Balance:  Sitting: Intact  Standing: Impaired  Standing - Static: Fair  Standing - Dynamic : Fair  Ambulation/Gait Training:  Distance (ft): 50 Feet (ft) (x 2)  Assistive Device: Gait belt;Walker, rolling  Ambulation - Level of Assistance: Minimal assistance        Gait Abnormalities: Decreased step clearance; Foot drop        Base of Support: Widened  Stance: Right decreased  Speed/Fela: Slow;Pace decreased (<100 feet/min); Shuffled  Step Length: Left shortened;Right shortened          Pain Rating:  No c/o pain    Activity Tolerance:   Fair and requires rest breaks    After treatment patient left in no apparent distress:   Sitting in chair, Call bell within reach, and Bed / chair alarm activated    COMMUNICATION/COLLABORATION:   The patients plan of care was discussed with: Physical therapist, Occupational therapist, and Registered nurse.      Apollo Aldana PT, DPT   Time Calculation: 15 mins

## 2022-11-03 NOTE — PROGRESS NOTES
Problem: Falls - Risk of  Goal: *Absence of Falls  Description: Document Tigre Garcia Fall Risk and appropriate interventions in the flowsheet.   Outcome: Progressing Towards Goal  Note: Fall Risk Interventions:  Mobility Interventions: Bed/chair exit alarm, Patient to call before getting OOB         Medication Interventions: Bed/chair exit alarm, Teach patient to arise slowly    Elimination Interventions: Call light in reach, Bed/chair exit alarm, Patient to call for help with toileting needs              Problem: Patient Education: Go to Patient Education Activity  Goal: Patient/Family Education  Outcome: Progressing Towards Goal     Problem: Patient Education: Go to Patient Education Activity  Goal: Patient/Family Education  Outcome: Progressing Towards Goal     Problem: TIA/CVA Stroke: 0-24 hours  Goal: Off Pathway (Use only if patient is Off Pathway)  Outcome: Progressing Towards Goal  Goal: Activity/Safety  Outcome: Progressing Towards Goal  Goal: Consults, if ordered  Outcome: Progressing Towards Goal  Goal: Diagnostic Test/Procedures  Outcome: Progressing Towards Goal  Goal: Nutrition/Diet  Outcome: Progressing Towards Goal  Goal: Discharge Planning  Outcome: Progressing Towards Goal  Goal: Medications  Outcome: Progressing Towards Goal  Goal: Respiratory  Outcome: Progressing Towards Goal  Goal: Treatments/Interventions/Procedures  Outcome: Progressing Towards Goal  Goal: Minimize risk of bleeding post-thrombolytic infusion  Outcome: Progressing Towards Goal  Goal: Monitor for complications post-thrombolytic infusion  Outcome: Progressing Towards Goal  Goal: Psychosocial  Outcome: Progressing Towards Goal  Goal: *Hemodynamically stable  Outcome: Progressing Towards Goal  Goal: *Neurologically stable  Description: Absence of additional neurological deficits    Outcome: Progressing Towards Goal  Goal: *Verbalizes anxiety and depression are reduced or absent  Outcome: Progressing Towards Goal  Goal: *Absence of Signs of Aspiration on Current Diet  Outcome: Progressing Towards Goal  Goal: *Absence of deep venous thrombosis signs and symptoms(Stroke Metric)  Outcome: Progressing Towards Goal  Goal: *Ability to perform ADLs and demonstrates progressive mobility and function  Outcome: Progressing Towards Goal  Goal: *Stroke education started(Stroke Metric)  Outcome: Progressing Towards Goal  Goal: *Dysphagia screen performed(Stroke Metric)  Outcome: Progressing Towards Goal  Goal: *Rehab consulted(Stroke Metric)  Outcome: Progressing Towards Goal     Problem: TIA/CVA Stroke: Day 2 Until Discharge  Goal: Off Pathway (Use only if patient is Off Pathway)  Outcome: Progressing Towards Goal  Goal: Activity/Safety  Outcome: Progressing Towards Goal  Goal: Diagnostic Test/Procedures  Outcome: Progressing Towards Goal  Goal: Nutrition/Diet  Outcome: Progressing Towards Goal  Goal: Discharge Planning  Outcome: Progressing Towards Goal  Goal: Medications  Outcome: Progressing Towards Goal  Goal: Respiratory  Outcome: Progressing Towards Goal  Goal: Treatments/Interventions/Procedures  Outcome: Progressing Towards Goal  Goal: Psychosocial  Outcome: Progressing Towards Goal  Goal: *Verbalizes anxiety and depression are reduced or absent  Outcome: Progressing Towards Goal  Goal: *Absence of aspiration  Outcome: Progressing Towards Goal  Goal: *Absence of deep venous thrombosis signs and symptoms(Stroke Metric)  Outcome: Progressing Towards Goal  Goal: *Optimal pain control at patient's stated goal  Outcome: Progressing Towards Goal  Goal: *Tolerating diet  Outcome: Progressing Towards Goal  Goal: *Ability to perform ADLs and demonstrates progressive mobility and function  Outcome: Progressing Towards Goal  Goal: *Stroke education continued(Stroke Metric)  Outcome: Progressing Towards Goal     Problem: Ischemic Stroke: Discharge Outcomes  Goal: *Verbalizes anxiety and depression are reduced or absent  Outcome: Progressing Towards Goal  Goal: *Verbalize understanding of risk factor modification(Stroke Metric)  Outcome: Progressing Towards Goal  Goal: *Hemodynamically stable  Outcome: Progressing Towards Goal  Goal: *Absence of aspiration pneumonia  Outcome: Progressing Towards Goal  Goal: *Aware of needed dietary changes  Outcome: Progressing Towards Goal  Goal: *Verbalize understanding of prescribed medications including anti-coagulants, anti-lipid, and/or anti-platelets(Stroke Metric)  Outcome: Progressing Towards Goal  Goal: *Tolerating diet  Outcome: Progressing Towards Goal  Goal: *Aware of follow-up diagnostics related to anticoagulants  Outcome: Progressing Towards Goal  Goal: *Ability to perform ADLs and demonstrates progressive mobility and function  Outcome: Progressing Towards Goal  Goal: *Absence of DVT(Stroke Metric)  Outcome: Progressing Towards Goal  Goal: *Absence of aspiration  Outcome: Progressing Towards Goal  Goal: *Optimal pain control at patient's stated goal  Outcome: Progressing Towards Goal  Goal: *Home safety concerns addressed  Outcome: Progressing Towards Goal  Goal: *Describes available resources and support systems  Outcome: Progressing Towards Goal  Goal: *Verbalizes understanding of activation of EMS(911) for stroke symptoms(Stroke Metric)  Outcome: Progressing Towards Goal  Goal: *Understands and describes signs and symptoms to report to providers(Stroke Metric)  Outcome: Progressing Towards Goal  Goal: *Neurolgocially stable (absence of additional neurological deficits)  Outcome: Progressing Towards Goal  Goal: *Verbalizes importance of follow-up with primary care physician(Stroke Metric)  Outcome: Progressing Towards Goal  Goal: *Smoking cessation discussed,if applicable(Stroke Metric)  Outcome: Progressing Towards Goal  Goal: *Depression screening completed(Stroke Metric)  Outcome: Progressing Towards Goal     Problem: Patient Education: Go to Patient Education Activity  Goal: Patient/Family Education  Outcome: Progressing Towards Goal

## 2022-11-03 NOTE — PROGRESS NOTES
End of Shift Note     Bedside shift change report given to Madeline RN (oncoming nurse) by Sanjeev Palm (offgoing nurse). Report included the following information      Shift worked:  7a-7p   Shift summary and any significant changes:     No acute changes. RLE weakness. Sensation intact. CM following for DC needs  Plan to DC to IRF      Concerns for physician to address:     Zone phone for oncoming shift:         Patient Information  Mehran Sawant  74 y.o.  10/31/2022  1:58 PM by Jennie Read MD. Mehran Sawant was admitted from      Problem List          Patient Active Problem List     Diagnosis Date Noted    Acute CVA (cerebrovascular accident) ([facility] [address] 10/31/2022      No past medical history on file. Core Measures:  CVA:  Y  CHF: N  PNA: N     Activity:  Activity Level: Up with Assistance  Number times ambulated in hallways past shift:   Number of times OOB to chair past shift:      Cardiac:   Cardiac Monitoring:          Access:   Current line(s):    Central Line? Placement date  Reason Medically Necessary   PICC LINE? Placement date Reason Medically Necessary      Genitourinary:   Urinary status:   Urinary Catheter? Placement Date  Reason Medically Necessary      Respiratory:   O2 Device: None (Room air)  Chronic home O2 use?:   Incentive spirometer at bedside:      GI:  Current diet:  ADULT DIET Regular  DIET ONE TIME MESSAGE  Passing flatus: Tolerating current diet:      Pain Management:   Patient states pain is manageable on current regimen:      Skin:  Ger Score: 21  Interventions:     Patient Safety:  Fall Score:  Total Score: 2  Interventions:   @Rollbelt  @dexterity to release roll belt  Yes/No ( must document dexterity  here by stating Yes or No here, otherwise this is a restraint and must follow restraint documentation policy.)     DVT prophylaxis:  DVT prophylaxis Med- lovenox  DVT prophylaxis SCD or VIJAYA-       Wounds: (If Applicable)  Wounds-   Location      Active Consults:  IP CONSULT TO HOSPITALIST  IP CONSULT TO NEUROLOGY     Length of Stay:  Expected LOS: - - -  Actual LOS: 3  Discharge Plan:

## 2022-11-03 NOTE — PROGRESS NOTES
Problem: Falls - Risk of  Goal: *Absence of Falls  Description: Document Ruel Edward Fall Risk and appropriate interventions in the flowsheet.   Outcome: Progressing Towards Goal  Note: Fall Risk Interventions:  Mobility Interventions: Bed/chair exit alarm         Medication Interventions: Bed/chair exit alarm    Elimination Interventions: Bed/chair exit alarm, Call light in reach              Problem: Patient Education: Go to Patient Education Activity  Goal: Patient/Family Education  Outcome: Progressing Towards Goal     Problem: TIA/CVA Stroke: 0-24 hours  Goal: Off Pathway (Use only if patient is Off Pathway)  Outcome: Progressing Towards Goal  Goal: Activity/Safety  Outcome: Progressing Towards Goal  Goal: Consults, if ordered  Outcome: Progressing Towards Goal  Goal: Diagnostic Test/Procedures  Outcome: Progressing Towards Goal  Goal: Nutrition/Diet  Outcome: Progressing Towards Goal  Goal: Discharge Planning  Outcome: Progressing Towards Goal  Goal: Medications  Outcome: Progressing Towards Goal  Goal: Respiratory  Outcome: Progressing Towards Goal  Goal: Treatments/Interventions/Procedures  Outcome: Progressing Towards Goal  Goal: Minimize risk of bleeding post-thrombolytic infusion  Outcome: Progressing Towards Goal  Goal: Monitor for complications post-thrombolytic infusion  Outcome: Progressing Towards Goal  Goal: Psychosocial  Outcome: Progressing Towards Goal  Goal: *Hemodynamically stable  Outcome: Progressing Towards Goal  Goal: *Neurologically stable  Description: Absence of additional neurological deficits    Outcome: Progressing Towards Goal  Goal: *Verbalizes anxiety and depression are reduced or absent  Outcome: Progressing Towards Goal  Goal: *Absence of Signs of Aspiration on Current Diet  Outcome: Progressing Towards Goal  Goal: *Absence of deep venous thrombosis signs and symptoms(Stroke Metric)  Outcome: Progressing Towards Goal  Goal: *Ability to perform ADLs and demonstrates progressive mobility and function  Outcome: Progressing Towards Goal  Goal: *Stroke education started(Stroke Metric)  Outcome: Progressing Towards Goal  Goal: *Dysphagia screen performed(Stroke Metric)  Outcome: Progressing Towards Goal  Goal: *Rehab consulted(Stroke Metric)  Outcome: Progressing Towards Goal     Problem: TIA/CVA Stroke: Day 2 Until Discharge  Goal: Off Pathway (Use only if patient is Off Pathway)  Outcome: Progressing Towards Goal  Goal: Activity/Safety  Outcome: Progressing Towards Goal  Goal: Diagnostic Test/Procedures  Outcome: Progressing Towards Goal  Goal: Nutrition/Diet  Outcome: Progressing Towards Goal  Goal: Discharge Planning  Outcome: Progressing Towards Goal  Goal: Medications  Outcome: Progressing Towards Goal  Goal: Respiratory  Outcome: Progressing Towards Goal  Goal: Treatments/Interventions/Procedures  Outcome: Progressing Towards Goal  Goal: Psychosocial  Outcome: Progressing Towards Goal  Goal: *Verbalizes anxiety and depression are reduced or absent  Outcome: Progressing Towards Goal  Goal: *Absence of aspiration  Outcome: Progressing Towards Goal  Goal: *Absence of deep venous thrombosis signs and symptoms(Stroke Metric)  Outcome: Progressing Towards Goal  Goal: *Optimal pain control at patient's stated goal  Outcome: Progressing Towards Goal  Goal: *Tolerating diet  Outcome: Progressing Towards Goal  Goal: *Ability to perform ADLs and demonstrates progressive mobility and function  Outcome: Progressing Towards Goal  Goal: *Stroke education continued(Stroke Metric)  Outcome: Progressing Towards Goal     Problem: Ischemic Stroke: Discharge Outcomes  Goal: *Verbalizes anxiety and depression are reduced or absent  Outcome: Progressing Towards Goal  Goal: *Verbalize understanding of risk factor modification(Stroke Metric)  Outcome: Progressing Towards Goal  Goal: *Hemodynamically stable  Outcome: Progressing Towards Goal  Goal: *Absence of aspiration pneumonia  Outcome: Progressing Towards Goal  Goal: *Aware of needed dietary changes  Outcome: Progressing Towards Goal  Goal: *Verbalize understanding of prescribed medications including anti-coagulants, anti-lipid, and/or anti-platelets(Stroke Metric)  Outcome: Progressing Towards Goal  Goal: *Tolerating diet  Outcome: Progressing Towards Goal  Goal: *Aware of follow-up diagnostics related to anticoagulants  Outcome: Progressing Towards Goal  Goal: *Ability to perform ADLs and demonstrates progressive mobility and function  Outcome: Progressing Towards Goal  Goal: *Absence of DVT(Stroke Metric)  Outcome: Progressing Towards Goal  Goal: *Absence of aspiration  Outcome: Progressing Towards Goal  Goal: *Optimal pain control at patient's stated goal  Outcome: Progressing Towards Goal  Goal: *Home safety concerns addressed  Outcome: Progressing Towards Goal  Goal: *Describes available resources and support systems  Outcome: Progressing Towards Goal  Goal: *Verbalizes understanding of activation of EMS(911) for stroke symptoms(Stroke Metric)  Outcome: Progressing Towards Goal  Goal: *Understands and describes signs and symptoms to report to providers(Stroke Metric)  Outcome: Progressing Towards Goal  Goal: *Neurolgocially stable (absence of additional neurological deficits)  Outcome: Progressing Towards Goal  Goal: *Verbalizes importance of follow-up with primary care physician(Stroke Metric)  Outcome: Progressing Towards Goal  Goal: *Smoking cessation discussed,if applicable(Stroke Metric)  Outcome: Progressing Towards Goal  Goal: *Depression screening completed(Stroke Metric)  Outcome: Progressing Towards Goal     Problem: Patient Education: Go to Patient Education Activity  Goal: Patient/Family Education  Outcome: Progressing Towards Goal

## 2022-11-03 NOTE — PROGRESS NOTES
End of Shift Note     Bedside shift change report given to Flor Morales (oncoming nurse) by Aman Pastor (offgoing nurse). Report included the following information      Shift worked:  Nights   Shift summary and any significant changes:      No acute changes. Concerns for physician to address:  none   Zone phone for oncoming shift:   [phone]      Patient Information  Phan Anaya  74 y.o.  10/31/2022  1:58 PM by Sudha Nelson MD. Phan Anaya was admitted from      Problem List          Patient Active Problem List     Diagnosis Date Noted    Acute CVA (cerebrovascular accident) ([address] 10/31/2022      No past medical history on file. Core Measures:  CVA:  Y  CHF: N  PNA: N     Activity:  Activity Level: Up with Assistance  Number times ambulated in hallways past shift:   Number of times OOB to chair past shift:      Cardiac:   Cardiac Monitoring:          Access:   Current line(s):    Central Line? Placement date  Reason Medically Necessary   PICC LINE? Placement date Reason Medically Necessary      Genitourinary:   Urinary status:   Urinary Catheter? Placement Date  Reason Medically Necessary      Respiratory:   O2 Device: None (Room air)  Chronic home O2 use?:   Incentive spirometer at bedside:      GI:  Current diet:  ADULT DIET Regular  DIET ONE TIME MESSAGE  Passing flatus: Tolerating current diet:      Pain Management:   Patient states pain is manageable on current regimen:      Skin:  Ger Score: 21  Interventions:     Patient Safety:  Fall Score:  Total Score: 2  Interventions:   @Rollbelt  @dexterity to release roll belt  Yes/No ( must document dexterity  here by stating Yes or No here, otherwise this is a restraint and must follow restraint documentation policy.)     DVT prophylaxis:  DVT prophylaxis Med- lovenox  DVT prophylaxis SCD or VIJAYA-       Wounds: (If Applicable)  Wounds-   Location      Active Consults:  IP CONSULT TO HOSPITALIST  IP CONSULT TO NEUROLOGY     Length of Stay:  Expected LOS: - - -  Actual LOS: 2  Discharge Plan:

## 2022-11-03 NOTE — PROGRESS NOTES
Hospitalist Progress Note    NAME: Shreya Charles   :  1955   MRN:  [id number]     Admit date: 10/31/2022    Today's date: 22    PCP: None    Anticipated discharge date:2022    Barriers: Patient is medically stable to be discharged to inpatient rehab once available    Assessment / Plan:    No change in plan of care in the last 24 hours. Small acute infarct left basal ganglia region POA  - Presented with right weakness x days  - CT head is within normal limits  - MRI brain read by radiology  Small acute infarct left basal ganglia region  - Carotid doppler Less than 50% ICA stenosis bilaterally  - Echo with no PFO or thrombus reported  - Telemetry reviewed, NSR, no atrial fib/flutter  - LDL 88, I ordered statin  - HgBa1c 5.2  - Aspirin 81 mg daily  - No smoking, discussed with patient at length  -PT OT recommending SNF, patient is medically stable to be discharged to inpatient rehab once available     Essential HTN POA  -Not seen a physician in many years  -Norvasc 5 mg started, still uncontrolled, Increase norvasc to 10 mg in AM  -/100 while in the ED consistent with hypertensive urgency  -Seen symptoms started more than 3 days ago we will start him on antihypertensives    Hyperlipidemia POA  - LDL 88  - lipitor    Tobacco use POA  - Counseled patient re importance of quitting    Overweight POA Body mass index is 26.32 kg/m². Code Status: Full code  Surrogate Decision Maker:     DVT Prophylaxis: Lovenox  GI Prophylaxis: not indicated     Baseline: From home, independent of ADLs    Subjective:   Patient was seen and examined. No acute events overnight. Discussed with RN overnight events. All patient's questions were answered.     \"Feeling very well\"    Review of Systems:  Symptom Y/N Comments  Symptom Y/N Comments   Fever/Chills n   Chest Pain n    Poor Appetite    Edema     Cough n   Abdominal Pain n    Sputum    Joint Pain     SOB/MARTINEZ n   Headache Nausea/vomit n   Tolerating PT/OT     Diarrhea n   Tolerating Diet y    Constipation    Other       Could NOT obtain due to:      Objective:     VITALS:   Last 24hrs VS reviewed since prior progress note. Most recent are:  Patient Vitals for the past 24 hrs:   Temp Pulse Resp BP SpO2   11/03/22 0940 -- -- -- -- 98 %   11/03/22 0730 97.9 °F (36.6 °C) 84 16 128/84 98 %   11/02/22 2330 98.3 °F (36.8 °C) 73 18 130/84 97 %         Intake/Output Summary (Last 24 hours) at 11/3/2022 1437  Last data filed at 11/3/2022 0617  Gross per 24 hour   Intake --   Output 1000 ml   Net -1000 ml          Wt Readings from Last 12 Encounters:   11/02/22 93 kg (205 lb)       PHYSICAL EXAM:    I had a face to face encounter and independently examined this patient on 11/03/22 as outlined below:    General: WD, WN. Alert, cooperative, no acute distress    EENT:  PERRL. Anicteric sclerae. MMM  Resp:  CTA bilaterally, no wheezing or rales. No accessory muscle use  CV:  Regular  rhythm,  No edema  GI:  Soft, Non distended, Non tender. +Bowel sounds, no rebound  Neurologic:  Alert and oriented X 3, normal speech, minimal right side weakness, no drift, pupils are reactive bilaterally  Psych:   Good insight. Not anxious nor agitated  Skin:  No rashes. No jaundice    Reviewed most current lab test results and cultures  YES  Reviewed most current radiology test results   YES  Review and summation of old records today    NO  Reviewed patient's current orders and MAR    YES  PMH/SH reviewed - no change compared to H&P  ________________________________________________________________________  Care Plan discussed with:    Comments   Patient x    Family      RN x    Care Manager     Consultant                        Multidiciplinary team rounds were held today with , nursing, pharmacist and clinical coordinator. Patient's plan of care was discussed; medications were reviewed and discharge planning was addressed. ________________________________________________________________________      Comments   >50% of visit spent in counseling and coordination of care     ________________________________________________________________________  Havery Books, MD     Procedures: see electronic medical records for all procedures/Xrays and details which were not copied into this note but were reviewed prior to creation of Plan. LABS:  I reviewed today's most current labs and imaging studies.   Pertinent labs include:  Recent Labs     11/01/22 0109   WBC 4.9   HGB 15.8   HCT 47.2          Recent Labs     11/01/22 0109      K 3.9      CO2 27   *   BUN 8   CREA 0.91   CA 8.9

## 2022-11-03 NOTE — PROGRESS NOTES
Transition of Care Plan:    RUR:  8%  Disposition:  home w/ family and either HH/PT or OP PT  Follow up appointments:  needs new PCP  DME needed:  RW at [facility] at Discharge:  Girlfriend  [place] or means to access home:    GIrlfriend     Medicare Letter:  to be given at [facility]  Is patient a Warden and connected with the [place]? No          If yes, was [employer] transfer form completed and VA notified? Caregiver Contact:  Tala Puente [phone]  Discharge Caregiver contacted prior to discharge? Yes  Care Conference needed?:    No    Reason for Admission:  CVA                     RUR Score:    8%                 Plan for utilizing home health:    Ref sent to [facility] to see if pt's Medicare Part A would cover [facility]      PCP: First and Last name:  None     Name of Practice:    Are you a current patient: Yes/No:    Approximate date of last visit:    Can you participate in a virtual visit with your PCP:                     Current Advanced Directive/Advance Care Plan: Full Code      Healthcare Decision Maker:   Click here to complete [address] including selection of the Healthcare Decision Maker Relationship (ie \"Primary\")             Primary Decision Maker: Tien Cummingsfriend - [phone]                  Transition of Care Plan:      Pt lives w/ his significant other and 4 young children in a split level home. 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 Had health insurance through job so only took Part A Medicare. CM explained that we are in Medicare enrollment period now and urged him to work on this after dc. PT/OT were initially recommending IP Rehab but pt is making good progress and is probably too high level for IP per ANA ref. Inquired if [facility] offer Financial Aid for OP PT at their locations via [facility]. Ref also sent to [facility].     Pt thinks he would be ok with either HH/PT or OP PT if Part A Medicare will cover.  Ref sent to [facility] via [facility]. Pt would benefit from RW at [place]. Can he pay OOP (approx. $45) or [employer]? NOAM 11/4/22    Care Management Interventions  PCP Verified by CM: No  Palliative Care Criteria Met (RRAT>21 & CHF Dx)?: No  Mode of Transport at Discharge:  Other (see comment)  Transition of Care Consult (CM Consult): Acute Rehab, Home Health  Discharge Durable Medical Equipment: Yes  Physical Therapy Consult: Yes  Occupational Therapy Consult: Yes  Speech Therapy Consult: Yes  Support Systems: Spouse/Significant Other  Confirm Follow Up Transport: Family   Resource Information Provided?: No  Discharge Location  Patient Expects to be Discharged to[de-identified] Home with home health     FLAVIO Hoskins

## 2022-11-04 VITALS
RESPIRATION RATE: 17 BRPM | DIASTOLIC BLOOD PRESSURE: 91 MMHG | HEART RATE: 74 BPM | OXYGEN SATURATION: 95 % | TEMPERATURE: 98.4 F | BODY MASS INDEX: 26.31 KG/M2 | SYSTOLIC BLOOD PRESSURE: 158 MMHG | HEIGHT: 74 IN | WEIGHT: 205 LBS

## 2022-11-04 PROCEDURE — 74011250637 HC RX REV CODE- 250/637: Performed by: INTERNAL MEDICINE

## 2022-11-04 PROCEDURE — 97116 GAIT TRAINING THERAPY: CPT

## 2022-11-04 PROCEDURE — 97530 THERAPEUTIC ACTIVITIES: CPT

## 2022-11-04 RX ORDER — GUAIFENESIN 100 MG/5ML
81 LIQUID (ML) ORAL DAILY
Qty: 30 TABLET | Refills: 0 | Status: SHIPPED | OUTPATIENT
Start: 2022-11-05

## 2022-11-04 RX ORDER — AMLODIPINE BESYLATE 10 MG/1
10 TABLET ORAL DAILY
Qty: 30 TABLET | Refills: 0 | Status: SHIPPED | OUTPATIENT
Start: 2022-11-05

## 2022-11-04 RX ORDER — ATORVASTATIN CALCIUM 40 MG/1
40 TABLET, FILM COATED ORAL
Qty: 30 TABLET | Refills: 0 | Status: SHIPPED | OUTPATIENT
Start: 2022-11-04

## 2022-11-04 RX ADMIN — ASPIRIN 81 MG: 81 TABLET, CHEWABLE ORAL at 08:13

## 2022-11-04 RX ADMIN — AMLODIPINE BESYLATE 10 MG: 5 TABLET ORAL at 08:13

## 2022-11-04 NOTE — DISCHARGE SUMMARY
Hospitalist Discharge Summary     Patient ID:  Primitivo Woodard  [id number]  74 y.o.  1955  10/31/2022    PCP on record: None    Admit date: 10/31/2022  Discharge date and time: 11/4/2022    DISCHARGE DIAGNOSIS:    Small acute infarct left basal ganglia region POA       Essential HTN POA    Hyperlipidemia POA    Tobacco use POA       CONSULTATIONS:  IP CONSULT TO HOSPITALIST  IP CONSULT TO NEUROLOGY    Excerpted HPI from H&P of Zelda Retana MD:    Primitivo Woodard is a 74 y.o.   male who presents with no significant past Friday when he started having some right arm and leg weakness for. The ED walking secondary to right  Vision and slurred speech or facial deviation. Did not experience any loss of consciousness. Does not report any weakness or tingling on the left side. Does not report any chest pain or shortness of breath. Denies any abdominal pain, nausea or vomiting. CT head is normal.  Chest x-ray is normal.    On arrival to emergency department, patient was noted to have elevated blood pressure of 173/93. On labs CBC is normal.  CMP is within normal limits. Troponin is 15. We were asked to admit for work up and evaluation of the above problems. ______________________________________________________________________  DISCHARGE SUMMARY/HOSPITAL COURSE:  for full details see H&P, daily progress notes, labs, consult notes.      Small acute infarct left basal ganglia region POA  - Presented with right weakness x days  - CT head is within normal limits  - MRI brain read by radiology  Small acute infarct left basal ganglia region  - Carotid doppler Less than 50% ICA stenosis bilaterally  - Echo with no PFO or thrombus reported  - Telemetry reviewed, NSR, no atrial fib/flutter  - LDL 88, I ordered statin  - HgBa1c 5.2  - Aspirin 81 mg daily  - No smoking, discussed with patient at length  -PT OT recommending inpatient rehab, patient is medically stable to be discharged at least with home PT OT and home health however I was notified that even that might not be an option due to him some insurance issues. Essential HTN POA  -Not seen a physician in many years  -Discharged on Norvasc 10 mg daily and follow-up as an outpatient with possible     Hyperlipidemia POA  - LDL 88  - lipitor 40 mg daily     Tobacco use POA  - Counseled patient re importance of quitting           _______________________________________________________________________  Patient seen and examined by me on discharge day. Pertinent Findings:    GEN: awake, alert, non-diaphoretic, no psychomotor agitation, no acute distress  HEENT: Atraumatic, normocephalic, no rashes noted, no facial lesions noted. Eyes: No redness, no discharge, no swelling. No drainage observed from nose. CARDIOPULMONARY: no increased respiratory effort, speaking in clear sentences, I:E ratio WNL  GI: Abdomen does not appear to be distended  MSK: normal ROM in the neck, upper extremities and lower extremities  SKIN: no lesions, wounds, erythema, or cyanosis noted on face or hands  NEURO: EOMs intact. No facial asymmetry. No aphasia or slurred speech. Symmetrical strength, Sensation grossly intact. Alert and oriented X 4. Pupils are reactive bilaterally. PSYCH: appearance, behavior, and attitude - well groomed, pleasant, cooperative    _______________________________________________________________________  DISCHARGE MEDICATIONS:   Current Discharge Medication List        START taking these medications    Details   amLODIPine (NORVASC) 10 mg tablet Take 1 Tablet by mouth daily. Qty: 30 Tablet, Refills: 0  Start date: 11/5/2022      aspirin 81 mg chewable tablet Take 1 Tablet by mouth daily. Qty: 30 Tablet, Refills: 0  Start date: 11/5/2022      atorvastatin (LIPITOR) 40 mg tablet Take 1 Tablet by mouth nightly. Qty: 30 Tablet, Refills: 0  Start date: 11/4/2022               Patient Follow Up Instructions:    Activity: Activity as tolerated  Diet: Resume previous diet      Follow-up Information       Follow up With Specialties Details Why Contact Nathaly Rivera MD Internal Medicine Physician Go on 11/14/2022 at 11:50am for your NEW PATIENT PCP hospital follow up. Please arrive 15 minutes early, bring photo ID, insurance cards, copay, medication bottles and any completed forms.  [address]  [address] [facility] Neurology Schedule an appointment as soon as possible for a visit in 3 week(s)  [address] [facility] [place]  [phone]    [facility]  Call As needed Mobile Urgent Care  [phone]          ________________________________________________________________    Risk of deterioration: Low    Condition at Discharge:  Stable  __________________________________________________________________    Disposition  Home with family, no needs    ____________________________________________________________________    Code Status: Full Code  ___________________________________________________________________      Total time in minutes spent coordinating this discharge (includes going over instructions, follow-up, prescriptions, and preparing report for sign off to her PCP) :  >30 minutes    Signed:  Gerardo Tuttle MD

## 2022-11-04 NOTE — PROGRESS NOTES
Problem: Falls - Risk of  Goal: *Absence of Falls  Description: Document Lin Blades Fall Risk and appropriate interventions in the flowsheet.   Outcome: Progressing Towards Goal  Note: Fall Risk Interventions:  Mobility Interventions: Bed/chair exit alarm, Patient to call before getting OOB         Medication Interventions: Bed/chair exit alarm, Patient to call before getting OOB    Elimination Interventions: Bed/chair exit alarm, Call light in reach              Problem: Patient Education: Go to Patient Education Activity  Goal: Patient/Family Education  Outcome: Progressing Towards Goal     Problem: Patient Education: Go to Patient Education Activity  Goal: Patient/Family Education  Outcome: Progressing Towards Goal     Problem: TIA/CVA Stroke: 0-24 hours  Goal: Off Pathway (Use only if patient is Off Pathway)  Outcome: Progressing Towards Goal  Goal: Activity/Safety  Outcome: Progressing Towards Goal  Goal: Consults, if ordered  Outcome: Progressing Towards Goal  Goal: Diagnostic Test/Procedures  Outcome: Progressing Towards Goal  Goal: Nutrition/Diet  Outcome: Progressing Towards Goal  Goal: Discharge Planning  Outcome: Progressing Towards Goal  Goal: Medications  Outcome: Progressing Towards Goal  Goal: Respiratory  Outcome: Progressing Towards Goal  Goal: Treatments/Interventions/Procedures  Outcome: Progressing Towards Goal  Goal: Minimize risk of bleeding post-thrombolytic infusion  Outcome: Progressing Towards Goal  Goal: Monitor for complications post-thrombolytic infusion  Outcome: Progressing Towards Goal  Goal: Psychosocial  Outcome: Progressing Towards Goal  Goal: *Hemodynamically stable  Outcome: Progressing Towards Goal  Goal: *Neurologically stable  Description: Absence of additional neurological deficits    Outcome: Progressing Towards Goal  Goal: *Verbalizes anxiety and depression are reduced or absent  Outcome: Progressing Towards Goal  Goal: *Absence of Signs of Aspiration on Current Diet  Outcome: Progressing Towards Goal  Goal: *Absence of deep venous thrombosis signs and symptoms(Stroke Metric)  Outcome: Progressing Towards Goal  Goal: *Ability to perform ADLs and demonstrates progressive mobility and function  Outcome: Progressing Towards Goal  Goal: *Stroke education started(Stroke Metric)  Outcome: Progressing Towards Goal  Goal: *Dysphagia screen performed(Stroke Metric)  Outcome: Progressing Towards Goal  Goal: *Rehab consulted(Stroke Metric)  Outcome: Progressing Towards Goal     Problem: TIA/CVA Stroke: Day 2 Until Discharge  Goal: Off Pathway (Use only if patient is Off Pathway)  Outcome: Progressing Towards Goal  Goal: Activity/Safety  Outcome: Progressing Towards Goal  Goal: Diagnostic Test/Procedures  Outcome: Progressing Towards Goal  Goal: Nutrition/Diet  Outcome: Progressing Towards Goal  Goal: Discharge Planning  Outcome: Progressing Towards Goal  Goal: Medications  Outcome: Progressing Towards Goal  Goal: Respiratory  Outcome: Progressing Towards Goal  Goal: Treatments/Interventions/Procedures  Outcome: Progressing Towards Goal  Goal: Psychosocial  Outcome: Progressing Towards Goal  Goal: *Verbalizes anxiety and depression are reduced or absent  Outcome: Progressing Towards Goal  Goal: *Absence of aspiration  Outcome: Progressing Towards Goal  Goal: *Absence of deep venous thrombosis signs and symptoms(Stroke Metric)  Outcome: Progressing Towards Goal  Goal: *Optimal pain control at patient's stated goal  Outcome: Progressing Towards Goal  Goal: *Tolerating diet  Outcome: Progressing Towards Goal  Goal: *Ability to perform ADLs and demonstrates progressive mobility and function  Outcome: Progressing Towards Goal  Goal: *Stroke education continued(Stroke Metric)  Outcome: Progressing Towards Goal     Problem: Ischemic Stroke: Discharge Outcomes  Goal: *Verbalizes anxiety and depression are reduced or absent  Outcome: Progressing Towards Goal  Goal: *Verbalize understanding of risk factor modification(Stroke Metric)  Outcome: Progressing Towards Goal  Goal: *Hemodynamically stable  Outcome: Progressing Towards Goal  Goal: *Absence of aspiration pneumonia  Outcome: Progressing Towards Goal  Goal: *Aware of needed dietary changes  Outcome: Progressing Towards Goal  Goal: *Verbalize understanding of prescribed medications including anti-coagulants, anti-lipid, and/or anti-platelets(Stroke Metric)  Outcome: Progressing Towards Goal  Goal: *Tolerating diet  Outcome: Progressing Towards Goal  Goal: *Aware of follow-up diagnostics related to anticoagulants  Outcome: Progressing Towards Goal  Goal: *Ability to perform ADLs and demonstrates progressive mobility and function  Outcome: Progressing Towards Goal  Goal: *Absence of DVT(Stroke Metric)  Outcome: Progressing Towards Goal  Goal: *Absence of aspiration  Outcome: Progressing Towards Goal  Goal: *Optimal pain control at patient's stated goal  Outcome: Progressing Towards Goal  Goal: *Home safety concerns addressed  Outcome: Progressing Towards Goal  Goal: *Describes available resources and support systems  Outcome: Progressing Towards Goal  Goal: *Verbalizes understanding of activation of EMS(911) for stroke symptoms(Stroke Metric)  Outcome: Progressing Towards Goal  Goal: *Understands and describes signs and symptoms to report to providers(Stroke Metric)  Outcome: Progressing Towards Goal  Goal: *Neurolgocially stable (absence of additional neurological deficits)  Outcome: Progressing Towards Goal  Goal: *Verbalizes importance of follow-up with primary care physician(Stroke Metric)  Outcome: Progressing Towards Goal  Goal: *Smoking cessation discussed,if applicable(Stroke Metric)  Outcome: Progressing Towards Goal  Goal: *Depression screening completed(Stroke Metric)  Outcome: Progressing Towards Goal     Problem: Patient Education: Go to Patient Education Activity  Goal: Patient/Family Education  Outcome: Progressing Towards Goal

## 2022-11-04 NOTE — PROGRESS NOTES
End of Shift Note     Bedside shift change report given to  TEDDY Mobley (oncoming nurse) by Minna Barraza LPN (offgoing nurse). Report included the following information      Shift worked: Nights   Shift summary and any significant changes:     No acute changes. Concerns for physician to address:  none   Zone phone for oncoming shift:   [phone]      Patient Information  Eric Montez  74 y.o.  10/31/2022  1:58 PM by Johnson Coreas MD. Eric Montez was admitted from      Problem List          Patient Active Problem List     Diagnosis Date Noted    Acute CVA (cerebrovascular accident) ([facility] [address] 10/31/2022      No past medical history on file. Core Measures:  CVA:  Y  CHF: N  PNA: N     Activity:  Activity Level: Up with Assistance  Number times ambulated in hallways past shift:   Number of times OOB to chair past shift:      Cardiac:   Cardiac Monitoring:          Access:   Current line(s):    Central Line? Placement date  Reason Medically Necessary   PICC LINE? Placement date Reason Medically Necessary      Genitourinary:   Urinary status:   Urinary Catheter? Placement Date  Reason Medically Necessary      Respiratory:   O2 Device: None (Room air)  Chronic home O2 use?:   Incentive spirometer at bedside:      GI:  Current diet:  ADULT DIET Regular  DIET ONE TIME MESSAGE  Passing flatus: Tolerating current diet:      Pain Management:   Patient states pain is manageable on current regimen:      Skin:  Egr Score: 21  Interventions:     Patient Safety:  Fall Score:  Total Score: 2  Interventions:   @Rollbelt  @dexterity to release roll belt  Yes/No ( must document dexterity  here by stating Yes or No here, otherwise this is a restraint and must follow restraint documentation policy.)     DVT prophylaxis:  DVT prophylaxis Med- lovenox  DVT prophylaxis SCD or VIJAYA-       Wounds: (If Applicable)  Wounds-   Location      Active Consults:  IP CONSULT TO HOSPITALIST  IP CONSULT TO NEUROLOGY     Length of Stay:  Expected LOS: - - -  Actual LOS: 3  Discharge Plan:

## 2022-11-04 NOTE — PROGRESS NOTES
NEW PATIENT PCP hospital follow-up transitional care appointment has been scheduled with Dr. Tiffany Duckworth on 11/14/22 at 1150. This new patient appt was made using the updated new patient appt workflow. Pending patient discharge.   Clemente Navarrete, Care Management Assistant

## 2022-11-04 NOTE — PROGRESS NOTES
Discharge instructions gone over with patient. Patient is excited about discharge. Patient states he has no further questions at this time. Patient IV and telemetry have been removed. Transportation pending. Told patient that stroke diagnoses patients can not trasnsport themselves home.

## 2022-11-04 NOTE — PROGRESS NOTES
Hospitalist Progress Note    NAME: Catrina Reinoso   :  1955   MRN:  [id number]     Admit date: 10/31/2022    Today's date: 22    PCP: None    Anticipated discharge date:2022    Barriers: Initial PT OT recommendation was inpatient rehab however patient was denied, discussed with case management SNF but it is unlikely to be accepted. She is working on home health and home PT OT if possible. Assessment / Plan:    No change in plan of care in the last 24 hours. Small acute infarct left basal ganglia region POA  - Presented with right weakness x days  - CT head is within normal limits  - MRI brain read by radiology  Small acute infarct left basal ganglia region  - Carotid doppler Less than 50% ICA stenosis bilaterally  - Echo with no PFO or thrombus reported  - Telemetry reviewed, NSR, no atrial fib/flutter  - LDL 88, I ordered statin  - HgBa1c 5.2  - Aspirin 81 mg daily  - No smoking, discussed with patient at length  -PT OT recommending inpatient rehab, patient is medically stable to be discharged at least with home PT OT and home health     Essential HTN POA  -Not seen a physician in many years  -Norvasc 5 mg started, still uncontrolled, Increase norvasc to 10 mg in AM  -/100 while in the ED consistent with hypertensive urgency  -Seen symptoms started more than 3 days ago we will start him on antihypertensives    Hyperlipidemia POA  - LDL 88  - lipitor    Tobacco use POA  - Counseled patient re importance of quitting    Overweight POA Body mass index is 26.32 kg/m². Code Status: Full code  Surrogate Decision Maker:     DVT Prophylaxis: Lovenox  GI Prophylaxis: not indicated     Baseline: From home, independent of ADLs    Subjective:   Patient was seen and examined. No acute events overnight. Discussed with RN overnight events. All patient's questions were answered.     \"I am doing okay but I want to go home as soon as I can\"    Review of Systems:  Symptom Y/N Comments  Symptom Y/N Comments   Fever/Chills n   Chest Pain n    Poor Appetite    Edema     Cough n   Abdominal Pain n    Sputum    Joint Pain     SOB/MARTINEZ n   Headache     Nausea/vomit n   Tolerating PT/OT     Diarrhea n   Tolerating Diet y    Constipation    Other       Could NOT obtain due to:      Objective:     VITALS:   Last 24hrs VS reviewed since prior progress note. Most recent are:  Patient Vitals for the past 24 hrs:   Temp Pulse Resp BP SpO2   11/04/22 0833 98.4 °F (36.9 °C) 78 17 129/84 98 %   11/03/22 2305 98.1 °F (36.7 °C) 77 17 127/89 95 %   11/03/22 1916 98.3 °F (36.8 °C) 85 17 (!) 142/73 95 %   11/03/22 1556 98.4 °F (36.9 °C) 82 18 (!) 149/72 96 %         Intake/Output Summary (Last 24 hours) at 11/4/2022 1429  Last data filed at 11/4/2022 0876  Gross per 24 hour   Intake --   Output 600 ml   Net -600 ml          Wt Readings from Last 12 Encounters:   11/02/22 93 kg (205 lb)       PHYSICAL EXAM:    I had a face to face encounter and independently examined this patient on 11/04/22 as outlined below:    General: WD, WN. Alert, cooperative, no acute distress    EENT:  PERRL. Anicteric sclerae. MMM  Resp:  CTA bilaterally, no wheezing or rales. No accessory muscle use  CV:  Regular  rhythm,  No edema  GI:  Soft, Non distended, Non tender. +Bowel sounds, no rebound  Neurologic:  Alert and oriented X 3, normal speech, minimal right side weakness, no drift, pupils are reactive bilaterally  Psych:   Good insight. Not anxious nor agitated  Skin:  No rashes.   No jaundice    Reviewed most current lab test results and cultures  YES  Reviewed most current radiology test results   YES  Review and summation of old records today    NO  Reviewed patient's current orders and MAR    YES  PMH/SH reviewed - no change compared to H&P  ________________________________________________________________________  Care Plan discussed with:    Comments   Patient x    Family      RN x    Care Manager     Consultant Multidiciplinary team rounds were held today with , nursing, pharmacist and clinical coordinator. Patient's plan of care was discussed; medications were reviewed and discharge planning was addressed. ________________________________________________________________________      Comments   >50% of visit spent in counseling and coordination of care     ________________________________________________________________________  Samir Aragon MD     Procedures: see electronic medical records for all procedures/Xrays and details which were not copied into this note but were reviewed prior to creation of Plan. LABS:  I reviewed today's most current labs and imaging studies. Pertinent labs include:  No results for input(s): WBC, HGB, HCT, PLT, HGBEXT, HCTEXT, PLTEXT, HGBEXT, HCTEXT, PLTEXT in the last 72 hours. No results for input(s): NA, K, CL, CO2, GLU, BUN, CREA, CA, MG, PHOS, ALB, TBIL, TBILI, ALT, INR, INREXT, INREXT in the last 72 hours.     No lab exists for component: SGOT

## 2022-11-04 NOTE — PROGRESS NOTES
Problem: Falls - Risk of  Goal: *Absence of Falls  Description: Document Ericka Counts Fall Risk and appropriate interventions in the flowsheet.   Outcome: Progressing Towards Goal  Note: Fall Risk Interventions:  Mobility Interventions: Bed/chair exit alarm, Patient to call before getting OOB         Medication Interventions: Bed/chair exit alarm, Patient to call before getting OOB    Elimination Interventions: Bed/chair exit alarm, Call light in reach

## 2022-11-04 NOTE — PROGRESS NOTES
Problem: Mobility Impaired (Adult and Pediatric)  Goal: *Acute Goals and Plan of Care (Insert Text)  Description:   FUNCTIONAL STATUS PRIOR TO ADMISSION: Patient was independent and active without use of DME.    HOME SUPPORT PRIOR TO ADMISSION: The patient lived with spouse and children but did not require assist.    Physical Therapy Goals  Initiated 11/1/2022  1. Patient will move from supine to sit and sit to supine  in bed with independence within 7 day(s). 2.  Patient will transfer from bed to chair and chair to bed with independence using the least restrictive device within 7 day(s). 3.  Patient will perform sit to stand with independence within 7 day(s). 4.  Patient will ambulate with independence for 120 feet with the least restrictive device within 7 day(s). 5.  Patient will ascend/descend 6 stairs with 1 handrail(s) with modified independence within 7 day(s). 6.  Patient will improve Thornton Balance score by 7 points within 7 days. Outcome: Progressing Towards Goal   PHYSICAL THERAPY TREATMENT  Patient: Catrina Reinoso (6 y.o. male)  Date: 11/4/2022  Diagnosis: Acute CVA (cerebrovascular accident) ([facility] [address] [I63.9] <principal problem not specified>      Precautions:    Chart, physical therapy assessment, plan of care and goals were reviewed. ASSESSMENT  Patient continues with skilled PT services and is progressing towards goals. Barriers to indep with functional mobility include decreased coordination R LE, impaired standing balance, gait dysfunction, increased fall risk. Pt tolerated gait training on unit with RW and CGA, cues for heel/ toe pattern, improved quality of movement with distance, however continues with decreased clearance on R. Stair training completed up/down 10 steps with bilat HR step-to pattern, min A for balance. Pt instructed in LE there ex as noted below.     CM in during session and confirmed no insurance coverage for follow up rehab/ HH/ outpt PT; notes plan to issue RW this afternoon. Reviewed PT recommendation for pt use of RW and family assist with all mobility at d/c. Emphasized continued fall risk due to decreased balance and coordination R LE; pt verbalized understanding. Issued gait belt for home use. Will benefit from con't PT for neuro re-ed, balance/ mobility training to increase pt safety and indep. Current Level of Function Impacting Discharge (mobility/balance): bed mob supervision, transfers SBA, amb with RW CGA, stair negotiation B HR min A    Other factors to consider for discharge: girlfriend able to provide assist         PLAN :  Patient continues to benefit from skilled intervention to address the above impairments. Continue treatment per established plan of care. to address goals.     Recommendation for discharge: (in order for the patient to meet his/her long term goals)  Pt would benefit from follow up outpt PT, however no insurance coverage per CM    This discharge recommendation:  Has been made in collaboration with the attending provider and/or case management    IF patient discharges home will need the following DME: rolling walker       SUBJECTIVE:   Patient stated I just need to get home today    OBJECTIVE DATA SUMMARY:   Critical Behavior:  Neurologic State: Alert  Orientation Level: Oriented X4  Cognition: Appropriate decision making, Appropriate for age attention/concentration, Appropriate safety awareness  Safety/Judgement: Awareness of environment, Insight into deficits  Functional Mobility Training:  Bed Mobility:     Supine to Sit: Supervision              Transfers:  Sit to Stand: Stand-by assistance;Contact guard assistance  Stand to Sit: Stand-by assistance                             Balance:  Sitting: Intact  Standing: Impaired  Standing - Static: Good;Constant support  Standing - Dynamic : Fair;Constant support  Ambulation/Gait Training:  Distance (ft): 125 Feet (ft) (+ 60')  Assistive Device: Gait belt;Walker, rolling  Ambulation - Level of Assistance: Contact guard assistance        Gait Abnormalities: Decreased step clearance; Foot drop        Base of Support: Widened  Stance: Right decreased  Speed/Fela: Pace decreased (<100 feet/min)  Step Length: Left shortened;Right shortened  Swing Pattern: Right asymmetrical       Stairs:  Number of Stairs Trained: 10  Stairs - Level of Assistance: Contact guard assistance;Minimum assistance   Rail Use: Both    Therapeutic Exercises:   Pt instructed LE coordination ex: heel to shin, rapid alternating toe taps; instructed in AROM ankle DF with focus on preventing inversion R foot with use of gait belt assist  Pain Rating:  No c/o pain    Activity Tolerance:   Good    After treatment patient left in no apparent distress:   Sitting in chair, Call bell within reach, and Bed / chair alarm activated    COMMUNICATION/COLLABORATION:   The patients plan of care was discussed with: Registered nurse.      Guy Boss, PT   Time Calculation: 58 mins

## 2023-02-05 ENCOUNTER — APPOINTMENT (OUTPATIENT)
Dept: GENERAL RADIOLOGY | Age: 68
End: 2023-02-05
Attending: PHYSICIAN ASSISTANT

## 2023-02-05 ENCOUNTER — HOSPITAL ENCOUNTER (EMERGENCY)
Age: 68
Discharge: HOME | End: 2023-02-05
Attending: EMERGENCY MEDICINE

## 2023-02-05 VITALS
HEART RATE: 96 BPM | OXYGEN SATURATION: 94 % | WEIGHT: 206.13 LBS | RESPIRATION RATE: 20 BRPM | DIASTOLIC BLOOD PRESSURE: 88 MMHG | SYSTOLIC BLOOD PRESSURE: 149 MMHG | HEIGHT: 74 IN | BODY MASS INDEX: 26.45 KG/M2 | TEMPERATURE: 97.5 F

## 2023-02-05 DIAGNOSIS — F17.200 SMOKER: ICD-10-CM

## 2023-02-05 DIAGNOSIS — J06.9 ACUTE URI: Primary | ICD-10-CM

## 2023-02-05 LAB
FLUAV AG NPH QL IA: NEGATIVE
FLUBV AG NOSE QL IA: NEGATIVE
SARS-COV-2 RDRP RESP QL NAA+PROBE: NOT DETECTED
SOURCE, COVRS: NORMAL

## 2023-02-05 PROCEDURE — 99284 EMERGENCY DEPT VISIT MOD MDM: CPT

## 2023-02-05 PROCEDURE — 87804 INFLUENZA ASSAY W/OPTIC: CPT

## 2023-02-05 PROCEDURE — 71046 X-RAY EXAM CHEST 2 VIEWS: CPT

## 2023-02-05 PROCEDURE — 87635 SARS-COV-2 COVID-19 AMP PRB: CPT

## 2023-02-05 NOTE — ED PROVIDER NOTES
[facility] EMERGENCY DEPT  EMERGENCY DEPARTMENT ENCOUNTER       Pt Name: Arcelia Acevedo  MRN: [id number]  [place] 1955  Date of evaluation: 2/5/2023  Provider: PROSPER Obregon   PCP: None  Note Started: 5:29 PM 2/5/23     CHIEF COMPLAINT       Chief Complaint   Patient presents with    Cold Symptoms     Cough, congestions, bodyaches, headache x1 week        HISTORY OF PRESENT ILLNESS: 1 or more elements      History From: Patient  HPI Limitations : None     Arcelia Acevedo is a 74 y.o. male who presents by POV with upper respiratory complaints that have been progressively worsening since onset 1 week ago. He reports myalgias, cough, congestion, headache, rhinorrhea, and generalized malaise. He denies any known sick contacts. There is been no treatment prior to arrival.  He is vaccinated for COVID but not influenza. Nursing Notes were all reviewed and agreed with or any disagreements were addressed in the HPI. REVIEW OF SYSTEMS      Review of Systems   Constitutional:  Positive for fatigue. Negative for chills, diaphoresis and fever. HENT:  Positive for congestion and rhinorrhea. Negative for ear pain and sore throat. Respiratory:  Positive for cough. Negative for shortness of breath. Cardiovascular:  Negative for chest pain. Gastrointestinal:  Negative for abdominal pain, constipation, diarrhea, nausea and vomiting. Genitourinary:  Negative for difficulty urinating, dysuria, frequency and hematuria. Musculoskeletal:  Positive for myalgias. Negative for arthralgias. Neurological:  Positive for headaches. All other systems reviewed and are negative. Positives and Pertinent negatives as per HPI. PAST HISTORY     Past Medical History:  No past medical history on file. Past Surgical History:  No past surgical history on file. Family History:  No family history on file.     Social History:  Social History     Tobacco Use    Smoking status: Every Day     Years: 0.50     Types: Cigarettes   Substance Use Topics    Drug use: Never       Allergies:  No Known Allergies    CURRENT MEDICATIONS      Previous Medications    AMLODIPINE (NORVASC) 10 MG TABLET    Take 1 Tablet by mouth daily. ASPIRIN 81 MG CHEWABLE TABLET    Take 1 Tablet by mouth daily. ATORVASTATIN (LIPITOR) 40 MG TABLET    Take 1 Tablet by mouth nightly. PHYSICAL EXAM      ED Triage Vitals [02/05/23 1652]   ED Encounter Vitals Group      BP (!) 149/88      Pulse (Heart Rate) 96      Resp Rate 20      Temp 97.5 °F (36.4 °C)      Temp src       O2 Sat (%) 94 %      Weight 206 lb 2.1 oz      Height 6' 2\"        Physical Exam  Vitals and nursing note reviewed. Constitutional:       General: He is not in acute distress. Appearance: He is well-developed. He is not diaphoretic. Comments: Pleasant 74 y.o. -American male    HENT:      Head: Normocephalic and atraumatic. Nose: Congestion and rhinorrhea present. Mouth/Throat:      Mouth: Mucous membranes are moist.      Pharynx: Oropharynx is clear. No oropharyngeal exudate or posterior oropharyngeal erythema. Eyes:      General:         Right eye: No discharge. Left eye: No discharge. Conjunctiva/sclera: Conjunctivae normal.   Cardiovascular:      Rate and Rhythm: Normal rate and regular rhythm. Heart sounds: Normal heart sounds. No murmur heard. Pulmonary:      Effort: Pulmonary effort is normal. No respiratory distress. Breath sounds: Normal breath sounds. Comments: Nonproductive cough. Speaking in clear and complete sentences. Musculoskeletal:      Cervical back: Normal range of motion and neck supple. Skin:     General: Skin is warm and dry. Neurological:      Mental Status: He is alert and oriented to person, place, and time. Psychiatric:         Behavior: Behavior normal.        DIAGNOSTIC RESULTS   LABS:     No results found for this or any previous visit (from the past 12 hour(s)). EKG:  When ordered, EKG's are interpreted by the Emergency Department Physician in the absence of a cardiologist.  Please see their note for interpretation of EKG. RADIOLOGY:  Non-plain film images such as CT, Ultrasound and MRI are read by the radiologist. Plain radiographic images are visualized and preliminarily interpreted by the ED Provider with the below findings:     No acute findings     Interpretation per the Radiologist below, if available at the time of this note:     XR CHEST PA LAT    Result Date: 2/5/2023  Indication:  cough Exam: PA and lateral views of the chest. Direct comparison is made to prior CXR dated October 2022. Findings: Cardiomediastinal silhouette is within normal limits. Lungs are clear bilaterally. Pleural spaces are normal. Osseous structures are intact. No acute cardiopulmonary disease. PROCEDURES   Unless otherwise noted below, none  Procedures     CRITICAL CARE TIME   none    EMERGENCY DEPARTMENT COURSE and DIFFERENTIAL DIAGNOSIS/MDM   Vitals:    Vitals:    02/05/23 1652   BP: (!) 149/88   Pulse: 96   Resp: 20   Temp: 97.5 °F (36.4 °C)   SpO2: 94%   Weight: 93.5 kg (206 lb 2.1 oz)   Height: 6' 2\" (1.88 m)        Patient was given the following medications:  Medications - No data to display    CONSULTS: (Who and What was discussed)  None    Chronic Conditions: none    Social Determinants affecting Dx or Tx: None    Records Reviewed (source and summary of external records): None    CC/HPI Summary, DDx, ED Course, and Reassessment: Joseph Lang the ED with upper respiratory complaints for 1 week. Vitals are stable. Pneumonia ruled out with normal chest x-ray. Influenza and COVID both negative on rapid testing. Likely viral URI in etiology. Will have patient take over-the-counter cough and cold medications for symptomatic relief. He can always follow-up with primary care medicine if not getting better or return to the ED for deterioration.          Disposition Considerations (Tests not done, Shared Decision Making, Pt Expectation of Test or Tx.): none     FINAL IMPRESSION     1. Acute URI    2. Smoker          DISPOSITION/PLAN       Discharge Note: The patient is stable for discharge home. The signs, symptoms, diagnosis, and discharge instructions have been discussed, understanding conveyed, and agreed upon. The patient is to follow up as recommended or return to ER should their symptoms worsen. PATIENT REFERRED TO:  Follow-up Information    None           DISCHARGE MEDICATIONS:  Current Discharge Medication List            DISCONTINUED MEDICATIONS:  Current Discharge Medication List          ED Attending Involvement : I have seen and evaluated the patient. My supervision physician was available for consultation. I am the Primary Clinician of Record. PROSPER Hardy (electronically signed)    (Please note that parts of this dictation were completed with voice recognition software. Quite often unanticipated grammatical, syntax, homophones, and other interpretive errors are inadvertently transcribed by the computer software. Please disregards these errors.  Please excuse any errors that have escaped final proofreading.)

## 2023-02-05 NOTE — DISCHARGE INSTRUCTIONS
It was a pleasure taking care of you at [facility] Emergency Department today. We know that when you come to [facility], you are entrusting us with your health, comfort, and safety. Our physicians and nurses honor that trust, and we truly appreciate the opportunity to care for you and your loved ones. We also value your feedback. If you receive a survey about your Emergency Department experience today, please fill it out. We care about our patients' feedback, and we listen to what you have to say. Thank you!

## 2024-11-15 ENCOUNTER — HOSPITAL ENCOUNTER (EMERGENCY)
Facility: HOSPITAL | Age: 69
Discharge: HOME | End: 2024-11-15
Attending: STUDENT IN AN ORGANIZED HEALTH CARE EDUCATION/TRAINING PROGRAM
Payer: MEDICARE

## 2024-11-15 ENCOUNTER — APPOINTMENT (OUTPATIENT)
Facility: HOSPITAL | Age: 69
End: 2024-11-15
Payer: MEDICARE

## 2024-11-15 VITALS
TEMPERATURE: 97.9 F | HEIGHT: 74 IN | BODY MASS INDEX: 26.62 KG/M2 | DIASTOLIC BLOOD PRESSURE: 65 MMHG | OXYGEN SATURATION: 96 % | WEIGHT: 207.45 LBS | RESPIRATION RATE: 15 BRPM | HEART RATE: 87 BPM | SYSTOLIC BLOOD PRESSURE: 146 MMHG

## 2024-11-15 DIAGNOSIS — K92.1 MELENA: ICD-10-CM

## 2024-11-15 DIAGNOSIS — R10.9 RIGHT FLANK PAIN: Primary | ICD-10-CM

## 2024-11-15 LAB
ALBUMIN SERPL-MCNC: 3.8 G/DL (ref 3.5–5)
ALBUMIN/GLOB SERPL: 1.1 (ref 1.1–2.2)
ALP SERPL-CCNC: 76 U/L (ref 45–117)
ALT SERPL-CCNC: 33 U/L (ref 12–78)
ANION GAP SERPL CALC-SCNC: 5 MMOL/L (ref 2–12)
AST SERPL-CCNC: 26 U/L (ref 15–37)
BASOPHILS # BLD: 0.1 K/UL (ref 0–0.1)
BASOPHILS NFR BLD: 2 % (ref 0–1)
BILIRUB SERPL-MCNC: 0.8 MG/DL (ref 0.2–1)
BUN SERPL-MCNC: 8 MG/DL (ref 6–20)
BUN/CREAT SERPL: 9 (ref 12–20)
CALCIUM SERPL-MCNC: 9.1 MG/DL (ref 8.5–10.1)
CHLORIDE SERPL-SCNC: 105 MMOL/L (ref 97–108)
CO2 SERPL-SCNC: 24 MMOL/L (ref 21–32)
CREAT SERPL-MCNC: 0.91 MG/DL (ref 0.7–1.3)
DIFFERENTIAL METHOD BLD: ABNORMAL
EOSINOPHIL # BLD: 0.1 K/UL (ref 0–0.4)
EOSINOPHIL NFR BLD: 1 % (ref 0–7)
ERYTHROCYTE [DISTWIDTH] IN BLOOD BY AUTOMATED COUNT: 13.2 % (ref 11.5–14.5)
GLOBULIN SER CALC-MCNC: 3.4 G/DL (ref 2–4)
GLUCOSE SERPL-MCNC: 106 MG/DL (ref 65–100)
HCT VFR BLD AUTO: 45.8 % (ref 36.6–50.3)
HGB BLD-MCNC: 15.8 G/DL (ref 12.1–17)
IMM GRANULOCYTES # BLD AUTO: 0 K/UL (ref 0–0.04)
IMM GRANULOCYTES NFR BLD AUTO: 0 % (ref 0–0.5)
LYMPHOCYTES # BLD: 2.2 K/UL (ref 0.8–3.5)
LYMPHOCYTES NFR BLD: 36 % (ref 12–49)
MCH RBC QN AUTO: 33 PG (ref 26–34)
MCHC RBC AUTO-ENTMCNC: 34.5 G/DL (ref 30–36.5)
MCV RBC AUTO: 95.6 FL (ref 80–99)
MONOCYTES # BLD: 0.8 K/UL (ref 0–1)
MONOCYTES NFR BLD: 13 % (ref 5–13)
NEUTS BAND NFR BLD MANUAL: 2 %
NEUTS SEG # BLD: 2.8 K/UL (ref 1.8–8)
NEUTS SEG NFR BLD: 46 % (ref 32–75)
NRBC # BLD: 0 K/UL (ref 0–0.01)
NRBC BLD-RTO: 0 PER 100 WBC
PLATELET # BLD AUTO: 215 K/UL (ref 150–400)
PMV BLD AUTO: 10.4 FL (ref 8.9–12.9)
POTASSIUM SERPL-SCNC: 3.7 MMOL/L (ref 3.5–5.1)
PROT SERPL-MCNC: 7.2 G/DL (ref 6.4–8.2)
RBC # BLD AUTO: 4.79 M/UL (ref 4.1–5.7)
RBC MORPH BLD: ABNORMAL
SODIUM SERPL-SCNC: 134 MMOL/L (ref 136–145)
WBC # BLD AUTO: 6 K/UL (ref 4.1–11.1)
WBC MORPH BLD: ABNORMAL

## 2024-11-15 PROCEDURE — 85025 COMPLETE CBC W/AUTO DIFF WBC: CPT

## 2024-11-15 PROCEDURE — 74177 CT ABD & PELVIS W/CONTRAST: CPT

## 2024-11-15 PROCEDURE — 36415 COLL VENOUS BLD VENIPUNCTURE: CPT

## 2024-11-15 PROCEDURE — 99285 EMERGENCY DEPT VISIT HI MDM: CPT

## 2024-11-15 PROCEDURE — 80053 COMPREHEN METABOLIC PANEL: CPT

## 2024-11-15 PROCEDURE — 6360000004 HC RX CONTRAST MEDICATION: Performed by: STUDENT IN AN ORGANIZED HEALTH CARE EDUCATION/TRAINING PROGRAM

## 2024-11-15 RX ORDER — IOPAMIDOL 755 MG/ML
100 INJECTION, SOLUTION INTRAVASCULAR
Status: COMPLETED | OUTPATIENT
Start: 2024-11-15 | End: 2024-11-15

## 2024-11-15 RX ADMIN — IOPAMIDOL 100 ML: 755 INJECTION, SOLUTION INTRAVENOUS at 10:22

## 2024-11-15 ASSESSMENT — PAIN DESCRIPTION - PAIN TYPE: TYPE: ACUTE PAIN

## 2024-11-15 ASSESSMENT — PAIN DESCRIPTION - FREQUENCY: FREQUENCY: INTERMITTENT

## 2024-11-15 ASSESSMENT — PAIN DESCRIPTION - LOCATION: LOCATION: FLANK

## 2024-11-15 ASSESSMENT — PAIN DESCRIPTION - ORIENTATION: ORIENTATION: RIGHT

## 2024-11-15 NOTE — ED PROVIDER NOTES
[facility] EMERGENCY DEPT  EMERGENCY DEPARTMENT ENCOUNTER       Pt Name: Win Washington  MRN: [id number]  Birthdate 1955  Date of evaluation: 11/15/2024  Provider: Javi Borrero MD   PCP: No, Pcp  Note Started: 9:57 AM EST 11/15/24     CHIEF COMPLAINT       Chief Complaint   Patient presents with    Flank Pain    Melena     Pt presents ambulatory to ED via triage for c/o intermittent right sided flank pain x2 weeks. Pt also noted that he started noticing black stools about 5 days ago. Pt denies any blood thinners.        HISTORY OF PRESENT ILLNESS: 1 or more elements      History From: patient, History limited by: none     Win Washington is a 69 y.o. male presenting with right flank pain and melena.  He notes the flank pain has been there for 2 to 3 days and has been on and off.  Has had melena over the past 2 to 3 days as well.  He otherwise feels well.  No nausea or vomiting.  Denies any urinary changes       Please See MDM for Additional Details of the HPI/PMH  Nursing Notes were all reviewed and agreed with or any disagreements were addressed in the HPI.     REVIEW OF SYSTEMS        Positives and Pertinent negatives as per HPI.    PAST HISTORY     Past Medical History:  History reviewed. No pertinent past medical history.    Past Surgical History:  History reviewed. No pertinent surgical history.    Family History:  History reviewed. No pertinent family history.    Social History:  Social History     Tobacco Use    Smoking status: Every Day     Current packs/day: 0.50     Types: Cigarettes   Substance Use Topics    Alcohol use: Yes    Drug use: Never       Allergies:  No Known Allergies    CURRENT MEDICATIONS      Previous Medications    AMLODIPINE (NORVASC) 10 MG TABLET    Take 1 tablet by mouth daily    ASPIRIN 81 MG CHEWABLE TABLET    Take 1 tablet by mouth daily    ATORVASTATIN (LIPITOR) 40 MG TABLET    Take 1 tablet by mouth nightly       SCREENINGS               No data recorded         PHYSICAL EXAM

## 2024-11-29 ENCOUNTER — HOSPITAL ENCOUNTER (EMERGENCY)
Facility: HOSPITAL | Age: 69
Discharge: HOME | End: 2024-11-29
Payer: MEDICARE

## 2024-11-29 VITALS
DIASTOLIC BLOOD PRESSURE: 97 MMHG | BODY MASS INDEX: 25.93 KG/M2 | WEIGHT: 202 LBS | HEIGHT: 74 IN | OXYGEN SATURATION: 99 % | HEART RATE: 94 BPM | SYSTOLIC BLOOD PRESSURE: 156 MMHG | TEMPERATURE: 97.9 F | RESPIRATION RATE: 18 BRPM

## 2024-11-29 DIAGNOSIS — G89.29 CHRONIC RIGHT-SIDED LOW BACK PAIN WITHOUT SCIATICA: Primary | ICD-10-CM

## 2024-11-29 DIAGNOSIS — B02.9 HERPES ZOSTER WITHOUT COMPLICATION: ICD-10-CM

## 2024-11-29 DIAGNOSIS — M54.50 CHRONIC RIGHT-SIDED LOW BACK PAIN WITHOUT SCIATICA: Primary | ICD-10-CM

## 2024-11-29 LAB
ALBUMIN SERPL-MCNC: 4 G/DL (ref 3.5–5)
ALBUMIN/GLOB SERPL: 1.3 (ref 1.1–2.2)
ALP SERPL-CCNC: 80 U/L (ref 45–117)
ALT SERPL-CCNC: 34 U/L (ref 12–78)
ANION GAP SERPL CALC-SCNC: 4 MMOL/L (ref 2–12)
APPEARANCE UR: CLEAR
AST SERPL-CCNC: 32 U/L (ref 15–37)
BACTERIA URNS QL MICRO: ABNORMAL /HPF
BASOPHILS # BLD: 0 K/UL (ref 0–0.1)
BASOPHILS NFR BLD: 1 % (ref 0–1)
BILIRUB SERPL-MCNC: 0.8 MG/DL (ref 0.2–1)
BILIRUB UR QL: NEGATIVE
BUN SERPL-MCNC: 9 MG/DL (ref 6–20)
BUN/CREAT SERPL: 10 (ref 12–20)
CALCIUM SERPL-MCNC: 9.3 MG/DL (ref 8.5–10.1)
CHLORIDE SERPL-SCNC: 108 MMOL/L (ref 97–108)
CO2 SERPL-SCNC: 29 MMOL/L (ref 21–32)
COLOR UR: ABNORMAL
CREAT SERPL-MCNC: 0.87 MG/DL (ref 0.7–1.3)
DIFFERENTIAL METHOD BLD: NORMAL
EOSINOPHIL # BLD: 0.1 K/UL (ref 0–0.4)
EOSINOPHIL NFR BLD: 1 % (ref 0–7)
EPITH CASTS URNS QL MICRO: ABNORMAL /LPF
ERYTHROCYTE [DISTWIDTH] IN BLOOD BY AUTOMATED COUNT: 13.2 % (ref 11.5–14.5)
GLOBULIN SER CALC-MCNC: 3.1 G/DL (ref 2–4)
GLUCOSE SERPL-MCNC: 96 MG/DL (ref 65–100)
GLUCOSE UR STRIP.AUTO-MCNC: NEGATIVE MG/DL
HCT VFR BLD AUTO: 48.4 % (ref 36.6–50.3)
HGB BLD-MCNC: 16.7 G/DL (ref 12.1–17)
HGB UR QL STRIP: ABNORMAL
HYALINE CASTS URNS QL MICRO: ABNORMAL /LPF (ref 0–2)
IMM GRANULOCYTES # BLD AUTO: 0 K/UL (ref 0–0.04)
IMM GRANULOCYTES NFR BLD AUTO: 0 % (ref 0–0.5)
KETONES UR QL STRIP.AUTO: NEGATIVE MG/DL
LEUKOCYTE ESTERASE UR QL STRIP.AUTO: ABNORMAL
LIPASE SERPL-CCNC: 14 U/L (ref 13–75)
LYMPHOCYTES # BLD: 2.8 K/UL (ref 0.8–3.5)
LYMPHOCYTES NFR BLD: 48 % (ref 12–49)
MCH RBC QN AUTO: 33.1 PG (ref 26–34)
MCHC RBC AUTO-ENTMCNC: 34.5 G/DL (ref 30–36.5)
MCV RBC AUTO: 96 FL (ref 80–99)
MONOCYTES # BLD: 0.7 K/UL (ref 0–1)
MONOCYTES NFR BLD: 12 % (ref 5–13)
NEUTS SEG # BLD: 2.2 K/UL (ref 1.8–8)
NEUTS SEG NFR BLD: 38 % (ref 32–75)
NITRITE UR QL STRIP.AUTO: NEGATIVE
NRBC # BLD: 0 K/UL (ref 0–0.01)
NRBC BLD-RTO: 0 PER 100 WBC
PH UR STRIP: 5.5 (ref 5–8)
PLATELET # BLD AUTO: 239 K/UL (ref 150–400)
PMV BLD AUTO: 9.8 FL (ref 8.9–12.9)
POTASSIUM SERPL-SCNC: 4.5 MMOL/L (ref 3.5–5.1)
PROT SERPL-MCNC: 7.1 G/DL (ref 6.4–8.2)
PROT UR STRIP-MCNC: NEGATIVE MG/DL
RBC # BLD AUTO: 5.04 M/UL (ref 4.1–5.7)
RBC #/AREA URNS HPF: ABNORMAL /HPF (ref 0–5)
SODIUM SERPL-SCNC: 141 MMOL/L (ref 136–145)
SP GR UR REFRACTOMETRY: 1.02
URINE CULTURE IF INDICATED: ABNORMAL
UROBILINOGEN UR QL STRIP.AUTO: 1 EU/DL (ref 0.2–1)
WBC # BLD AUTO: 5.9 K/UL (ref 4.1–11.1)
WBC URNS QL MICRO: ABNORMAL /HPF (ref 0–4)

## 2024-11-29 PROCEDURE — 99284 EMERGENCY DEPT VISIT MOD MDM: CPT

## 2024-11-29 PROCEDURE — 6360000002 HC RX W HCPCS

## 2024-11-29 PROCEDURE — 36415 COLL VENOUS BLD VENIPUNCTURE: CPT

## 2024-11-29 PROCEDURE — 85025 COMPLETE CBC W/AUTO DIFF WBC: CPT

## 2024-11-29 PROCEDURE — 81001 URINALYSIS AUTO W/SCOPE: CPT

## 2024-11-29 PROCEDURE — 96374 THER/PROPH/DIAG INJ IV PUSH: CPT

## 2024-11-29 PROCEDURE — 80053 COMPREHEN METABOLIC PANEL: CPT

## 2024-11-29 PROCEDURE — 83690 ASSAY OF LIPASE: CPT

## 2024-11-29 RX ORDER — CYCLOBENZAPRINE HCL 10 MG
10 TABLET ORAL 3 TIMES DAILY PRN
Qty: 21 TABLET | Refills: 0 | Status: SHIPPED | OUTPATIENT
Start: 2024-11-29 | End: 2024-12-09

## 2024-11-29 RX ORDER — IBUPROFEN 600 MG/1
600 TABLET, FILM COATED ORAL 3 TIMES DAILY PRN
Qty: 30 TABLET | Refills: 0 | Status: SHIPPED | OUTPATIENT
Start: 2024-11-29

## 2024-11-29 RX ORDER — KETOROLAC TROMETHAMINE 30 MG/ML
15 INJECTION, SOLUTION INTRAMUSCULAR; INTRAVENOUS ONCE
Status: COMPLETED | OUTPATIENT
Start: 2024-11-29 | End: 2024-11-29

## 2024-11-29 RX ORDER — METHYLPREDNISOLONE 4 MG/1
TABLET ORAL
Qty: 21 TABLET | Refills: 0 | Status: SHIPPED | OUTPATIENT
Start: 2024-11-29 | End: 2024-12-05

## 2024-11-29 RX ORDER — ACETAMINOPHEN 500 MG
500 TABLET ORAL EVERY 6 HOURS PRN
Qty: 28 TABLET | Refills: 0 | Status: SHIPPED | OUTPATIENT
Start: 2024-11-29 | End: 2024-12-06

## 2024-11-29 RX ADMIN — KETOROLAC TROMETHAMINE 15 MG: 30 INJECTION, SOLUTION INTRAMUSCULAR at 12:20

## 2024-11-29 ASSESSMENT — PAIN - FUNCTIONAL ASSESSMENT
PAIN_FUNCTIONAL_ASSESSMENT: 0-10
PAIN_FUNCTIONAL_ASSESSMENT: ACTIVITIES ARE NOT PREVENTED

## 2024-11-29 ASSESSMENT — PAIN DESCRIPTION - LOCATION: LOCATION: FLANK

## 2024-11-29 ASSESSMENT — PAIN DESCRIPTION - DESCRIPTORS: DESCRIPTORS: DISCOMFORT

## 2024-11-29 ASSESSMENT — PAIN DESCRIPTION - ONSET: ONSET: ON-GOING

## 2024-11-29 ASSESSMENT — PAIN DESCRIPTION - FREQUENCY: FREQUENCY: CONTINUOUS

## 2024-11-29 ASSESSMENT — PAIN SCALES - GENERAL: PAINLEVEL_OUTOF10: 5

## 2024-11-29 ASSESSMENT — PAIN DESCRIPTION - ORIENTATION: ORIENTATION: RIGHT

## 2024-11-29 NOTE — ED PROVIDER NOTES
Absolute 0.0 0.0 - 0.1 K/UL    Immature Granulocytes Absolute 0.0 0.00 - 0.04 K/UL    Differential Type AUTOMATED           EKG: When ordered, EKG's are interpreted by the Emergency Department Physician in the absence of a cardiologist.  Please see their note for interpretation of EKG.      RADIOLOGY:  Non-plain film images such as CT, Ultrasound and MRI are read by the radiologist. Plain radiographic images are visualized and preliminarily interpreted by the ED Provider with the below findings:     Interpretation per the Radiologist below, if available at the time of this note:     No results found.     PROCEDURES   Unless otherwise noted below, none  Procedures     CRITICAL CARE TIME   {critical care smartlist:67413}     EMERGENCY DEPARTMENT COURSE and DIFFERENTIAL DIAGNOSIS/MDM   Vitals:    Vitals:    11/29/24 1023   BP: (!) 156/97   Pulse: 94   Resp: 18   Temp: 97.9 °F (36.6 °C)   TempSrc: Oral   SpO2: 99%   Weight: 91.6 kg (202 lb)   Height: 1.88 m (6' 2\")        Patient was given the following medications:  Medications - No data to display    CONSULTS: (Who and What was discussed)  None    Chronic Conditions:  has no past medical history on file.     Social Determinants affecting Dx or Tx: {Social Barriers (Optional):09976}    Records Reviewed (source and summary of external records): {CDIRECORDSREVIEWED:[id number]}    CC/HPI Summary, DDx, ED Course, and Reassessment: ***    CLINICAL MANAGEMENT TOOLS:  {CMT List:44186::\"Not Applicable\"}    Heart Score: ***  NIHSS:***    ED Course as of 11/29/24 1202   Fri Nov 29, 2024   1201 Exam findings and patient history sound like right-sided low back pain.  Patient does have skin rash, reminiscent of a healing shingles rash. [CC]   1201 Patient states the last time he visited \"they did nothing\", however patient lab work done and a CT scan.  No acute findings during workup.  Patient diagnosed with melanoma at last visit, however states that he had been taking Pepto-Bismol

## 2024-11-29 NOTE — ED NOTES
Patient stable GCS15  Vitally stable not in any form of distress   IV cannula removed   Discharged summary given and understood